# Patient Record
Sex: FEMALE | Race: WHITE | NOT HISPANIC OR LATINO | Employment: FULL TIME | URBAN - METROPOLITAN AREA
[De-identification: names, ages, dates, MRNs, and addresses within clinical notes are randomized per-mention and may not be internally consistent; named-entity substitution may affect disease eponyms.]

---

## 2017-03-07 ENCOUNTER — HOSPITAL ENCOUNTER (OUTPATIENT)
Dept: EMERGENCY MEDICINE | Facility: HOSPITAL | Age: 22
End: 2017-03-08

## 2017-08-03 ENCOUNTER — HISTORICAL (OUTPATIENT)
Dept: RADIOLOGY | Facility: HOSPITAL | Age: 22
End: 2017-08-03

## 2017-11-14 ENCOUNTER — HISTORICAL (OUTPATIENT)
Dept: RADIOLOGY | Facility: HOSPITAL | Age: 22
End: 2017-11-14

## 2021-01-02 LAB
INFLUENZA A ANTIGEN, POC: POSITIVE
INFLUENZA B ANTIGEN, POC: NEGATIVE

## 2021-04-08 ENCOUNTER — HISTORICAL (OUTPATIENT)
Dept: URGENT CARE | Facility: CLINIC | Age: 26
End: 2021-04-08

## 2021-04-08 LAB
ABS NEUT (OLG): 4.75 X10(3)/MCL (ref 2.1–9.2)
ALBUMIN SERPL-MCNC: 3.8 GM/DL (ref 3.5–5)
ALBUMIN/GLOB SERPL: 1.2 RATIO (ref 1.1–2)
ALP SERPL-CCNC: 74 UNIT/L (ref 40–150)
ALT SERPL-CCNC: 46 UNIT/L (ref 0–55)
AST SERPL-CCNC: 26 UNIT/L (ref 5–34)
BASOPHILS # BLD AUTO: 0.1 X10(3)/MCL (ref 0–0.2)
BASOPHILS NFR BLD AUTO: 1 %
BILIRUB SERPL-MCNC: 0.4 MG/DL
BILIRUBIN DIRECT+TOT PNL SERPL-MCNC: 0.2 MG/DL (ref 0–0.5)
BILIRUBIN DIRECT+TOT PNL SERPL-MCNC: 0.2 MG/DL (ref 0–0.8)
BUN SERPL-MCNC: 7.2 MG/DL (ref 7–18.7)
CALCIUM SERPL-MCNC: 9.2 MG/DL (ref 8.4–10.2)
CHLORIDE SERPL-SCNC: 106 MMOL/L (ref 98–107)
CO2 SERPL-SCNC: 24 MMOL/L (ref 22–29)
CREAT SERPL-MCNC: 0.61 MG/DL (ref 0.55–1.02)
EOSINOPHIL # BLD AUTO: 0.1 X10(3)/MCL (ref 0–0.9)
EOSINOPHIL NFR BLD AUTO: 1 %
ERYTHROCYTE [DISTWIDTH] IN BLOOD BY AUTOMATED COUNT: 13.4 % (ref 11.5–17)
GLOBULIN SER-MCNC: 3.1 GM/DL (ref 2.4–3.5)
GLUCOSE SERPL-MCNC: 83 MG/DL (ref 74–100)
HCT VFR BLD AUTO: 44.9 % (ref 37–47)
HGB BLD-MCNC: 14.4 GM/DL (ref 12–16)
LYMPHOCYTES # BLD AUTO: 4.5 X10(3)/MCL (ref 0.6–4.6)
LYMPHOCYTES NFR BLD AUTO: 45 %
MCH RBC QN AUTO: 30.4 PG (ref 27–31)
MCHC RBC AUTO-ENTMCNC: 32.1 GM/DL (ref 33–36)
MCV RBC AUTO: 94.7 FL (ref 80–94)
MONOCYTES # BLD AUTO: 0.5 X10(3)/MCL (ref 0.1–1.3)
MONOCYTES NFR BLD AUTO: 5 %
NEUTROPHILS # BLD AUTO: 4.75 X10(3)/MCL (ref 2.1–9.2)
NEUTROPHILS NFR BLD AUTO: 48 %
PLATELET # BLD AUTO: 549 X10(3)/MCL (ref 130–400)
PMV BLD AUTO: 9.9 FL (ref 9.4–12.4)
POTASSIUM SERPL-SCNC: 4.6 MMOL/L (ref 3.5–5.1)
PROT SERPL-MCNC: 6.9 GM/DL (ref 6.4–8.3)
RBC # BLD AUTO: 4.74 X10(6)/MCL (ref 4.2–5.4)
SODIUM SERPL-SCNC: 140 MMOL/L (ref 136–145)
WBC # SPEC AUTO: 10 X10(3)/MCL (ref 4.5–11.5)

## 2021-08-17 ENCOUNTER — HISTORICAL (OUTPATIENT)
Dept: ADMINISTRATIVE | Facility: HOSPITAL | Age: 26
End: 2021-08-17

## 2021-08-17 LAB
ABS NEUT (OLG): 6 X10(3)/MCL (ref 2.1–9.2)
ALBUMIN SERPL-MCNC: 4 GM/DL (ref 3.4–5)
ALBUMIN/GLOB SERPL: 1.6 {RATIO} (ref 1.5–2.5)
ALP SERPL-CCNC: 65 UNIT/L (ref 38–126)
ALT SERPL-CCNC: 25 UNIT/L (ref 7–52)
AMYLASE SERPL-CCNC: 47 UNIT/L (ref 25–125)
AST SERPL-CCNC: 21 UNIT/L (ref 15–37)
BILIRUB SERPL-MCNC: 0.3 MG/DL (ref 0.2–1)
BILIRUBIN DIRECT+TOT PNL SERPL-MCNC: 0.1 MG/DL (ref 0–0.5)
BILIRUBIN DIRECT+TOT PNL SERPL-MCNC: 0.2 MG/DL
BUN SERPL-MCNC: 8 MG/DL (ref 7–18)
CALCIUM SERPL-MCNC: 9.6 MG/DL (ref 8.5–10)
CHLORIDE SERPL-SCNC: 104 MMOL/L (ref 98–107)
CO2 SERPL-SCNC: 26 MMOL/L (ref 21–32)
CREAT SERPL-MCNC: 0.64 MG/DL (ref 0.6–1.3)
ERYTHROCYTE [DISTWIDTH] IN BLOOD BY AUTOMATED COUNT: 12.7 % (ref 11.5–17)
GLOBULIN SER-MCNC: 2.5 GM/DL (ref 1.2–3)
GLUCOSE SERPL-MCNC: 139 MG/DL (ref 74–106)
H PYLORI AB SER IA-ACNC: NEGATIVE
HCT VFR BLD AUTO: 40.4 % (ref 37–47)
HGB BLD-MCNC: 13.2 GM/DL (ref 12–16)
LIPASE SERPL-CCNC: 24 U/L
LYMPHOCYTES # BLD AUTO: 4.7 X10(3)/MCL (ref 0.6–3.4)
LYMPHOCYTES NFR BLD AUTO: 40.2 % (ref 13–40)
MCH RBC QN AUTO: 29.9 PG (ref 27–31.2)
MCHC RBC AUTO-ENTMCNC: 33 GM/DL (ref 32–36)
MCV RBC AUTO: 91 FL (ref 80–94)
MONOCYTES # BLD AUTO: 1 X10(3)/MCL (ref 0.1–1.3)
MONOCYTES NFR BLD AUTO: 8.6 % (ref 0.1–24)
NEUTROPHILS NFR BLD AUTO: 51.2 % (ref 47–80)
PLATELET # BLD AUTO: 476 X10(3)/MCL (ref 130–400)
PMV BLD AUTO: 8.5 FL (ref 9.4–12.4)
POTASSIUM SERPL-SCNC: 5 MMOL/L (ref 3.5–5.1)
PROT SERPL-MCNC: 6.5 GM/DL (ref 6.4–8.2)
RBC # BLD AUTO: 4.42 X10(6)/MCL (ref 4.2–5.4)
SODIUM SERPL-SCNC: 138 MMOL/L (ref 136–145)
WBC # SPEC AUTO: 11.7 X10(3)/MCL (ref 4.5–11.5)

## 2021-09-02 ENCOUNTER — HISTORICAL (OUTPATIENT)
Dept: INFECTIOUS DISEASES | Facility: HOSPITAL | Age: 26
End: 2021-09-02

## 2021-09-02 ENCOUNTER — HISTORICAL (OUTPATIENT)
Dept: ADMINISTRATIVE | Facility: HOSPITAL | Age: 26
End: 2021-09-02

## 2021-09-02 LAB — SARS-COV-2 RNA RESP QL NAA+PROBE: POSITIVE

## 2021-10-15 LAB
INFLUENZA A ANTIGEN, POC: NEGATIVE
INFLUENZA B ANTIGEN, POC: NEGATIVE

## 2021-12-01 LAB
INFLUENZA A ANTIGEN, POC: NEGATIVE
INFLUENZA B ANTIGEN, POC: NEGATIVE
RAPID GROUP A STREP (OHS): NEGATIVE

## 2021-12-07 ENCOUNTER — HISTORICAL (OUTPATIENT)
Dept: ADMINISTRATIVE | Facility: HOSPITAL | Age: 26
End: 2021-12-07

## 2022-04-10 ENCOUNTER — HISTORICAL (OUTPATIENT)
Dept: ADMINISTRATIVE | Facility: HOSPITAL | Age: 27
End: 2022-04-10

## 2022-04-25 VITALS
WEIGHT: 293 LBS | BODY MASS INDEX: 45.99 KG/M2 | SYSTOLIC BLOOD PRESSURE: 138 MMHG | OXYGEN SATURATION: 97 % | DIASTOLIC BLOOD PRESSURE: 80 MMHG | HEIGHT: 67 IN

## 2022-05-03 NOTE — HISTORICAL [facility]
This is a historical note converted from [employer]. Formatting and pictures may have been removed.  Please reference [employer] for original formatting and attached multimedia. Chief Complaint  NP-EST CARE-STOMACH ISSUES  History of Present Illness  She has been having stomach issues since she was a child. She was diagnosed with reflux as a child.  Her stomach has been bothering her more for the last few months. She reports more frequent bowel movements. She normally has 2-3 bowel movements a day. Today, she has had 5 bowel movements.  + nausea, usually after eating. No vomiting. She denies abdominal pain after eating. She has occasional heartburn. She denies belching. She denies pain or difficulty with swallowing. Worse with tomato based, red dye or greasy food. She denies indigestion. Her symptoms occur more in evening and when she gets up. She denies any urinary issues. No fever/chills. She denies any significant weight changes. She denies jaundice or scleral icterus.  Review of Systems  See HPI.  Physical Exam  Vitals & Measurements  T:?37.2? ?C (Oral)? HR:?85(Peripheral)? BP:?130/76? SpO2:?97%?  HT:?170.17?cm? HT:?170.17?cm? WT:?143.800?kg? WT:?143.8?kg? BMI:?49.66? LMP:?08/02/2021 00:00 CDT?  General:?She is a well-developed well-nourished obese white female no apparent distress.? She is alert and oriented.? She has a?depressed affect.  Chest:?Clear to auscultation bilaterally.  CV: Regular rate and rhythm without murmurs rubs gallops.  Abdomen: Soft,?NABS, no masses, obese,?mild epigastric?and right upper quadrant tenderness with palpation.? No guarding or rebound.  Assessment/Plan  1.?Gastritis?K29.70  Avoid heavy,?greasy or spicy foods.  Start omeprazole 40 mg; take 1 capsule daily 30 minutes to 1 hour prior to meal.  Labs pending, including H. pylori, pending.  Zofran 8 mg ODT?1 tablet every 8 hours as needed for nausea vomiting.  Call if symptoms persist, worsen or new symptoms develop.  Ordered:  Amylase Level,  Routine collect, 08/17/21 14:59:00 CDT, Blood, Order for future visit, Stop date 08/17/21 14:59:00 CDT, Lab Collect, Gastritis, 08/17/21 14:59:00 CDT  Automated Diff, Routine collect, 08/17/21 15:03:00 CDT, Blood, Collected, Stop date 08/17/21 15:03:00 CDT, Lab Collect, Gastritis, 08/17/21 15:03:00 CDT  CBC w/ Auto Diff, Routine collect, 08/17/21 15:03:00 CDT, Blood, Stop date 08/17/21 15:03:00 CDT, Lab Collect, Gastritis, 08/17/21 15:03:00 CDT  Clinic Follow up, *Est. 09/17/21 3:00:00 CDT, Order for future visit, Gastritis  Gastroesophageal reflux disease, [facility]  Comprehensive Metabolic Panel, Routine collect, 08/17/21 15:03:00 CDT, Blood, Stop date 08/17/21 15:03:00 CDT, Lab Collect, Gastritis, 08/17/21 15:03:00 CDT  Helicobacter Pylori Antibody Qual, Routine collect, 08/17/21 15:03:00 CDT, Blood, Stop date 08/17/21 15:03:00 CDT, Lab Collect, Gastritis  Gastroesophageal reflux disease, 08/17/21 15:03:00 CDT  Lipase Level, Routine collect, 08/17/21 14:59:00 CDT, Blood, Order for future visit, Stop date 08/17/21 14:59:00 CDT, Lab Collect, Gastritis, 08/17/21 14:59:00 CDT  Office/Outpatient Visit Level 4 New [id number] PC, Gastritis  Gastroesophageal reflux disease  Morbid obesity, [facility] - AFP, 08/17/21 15:00:00 CDT  ?  2.?Gastroesophageal reflux disease?K21.9  ?See above.  Avoid lying down for 2-3 hours after eating.  Avoid triggering foods.  Ordered:  Clinic Follow up, *Est. 09/17/21 3:00:00 CDT, Order for future visit, Gastritis  Gastroesophageal reflux disease, [facility]  Helicobacter Pylori Antibody Qual, Routine collect, 08/17/21 15:03:00 CDT, Blood, Stop date 08/17/21 15:03:00 CDT, Lab Collect, Gastritis  Gastroesophageal reflux disease, 08/17/21 15:03:00 CDT  Office/Outpatient Visit Level 4 New [id number] PC, Gastritis  Gastroesophageal reflux disease  Morbid obesity, [facility] - AFP, 08/17/21 15:00:00 CDT  ?  3.?Morbid obesity?E66.01  Patient encouraged to lose weight.  Ordered:  Office/Outpatient  Visit Level 4 New [id number] PC, Gastritis  Gastroesophageal reflux disease  Morbid obesity, [facility] - AFP, 08/17/21 15:00:00 CDT  ?  Orders:  omeprazole, 40 mg = 1 cap(s), Oral, Daily, # 30 cap(s), 1 Refill(s), Pharmacy: [facility] #[id number], 170.17, cm, Height/Length Dosing, 08/17/21 14:26:00 CDT, 143.8, kg, Weight Dosing, 08/17/21 14:26:00 CDT  ondansetron, 8 mg = 1 tab(s), Oral, TID, # 20 tab(s), 0 Refill(s), Pharmacy: [facility] #[id number], 170.17, cm, Height/Length Dosing, 08/17/21 14:26:00 CDT, 143.8, kg, Weight Dosing, 08/17/21 14:26:00 CDT  Referrals  Clinic Follow up, *Est. 09/17/21 3:00:00 CDT, Order for future visit, Gastritis  Gastroesophageal reflux disease, [facility] AFP   Problem List/Past Medical History  Ongoing  Gastroesophageal reflux disease  Morbid obesity  Historical  Asthma  Asthma, mild intermittent  Fibula fracture  Procedure/Surgical History  Open reduction of fracture of tibia and fibula with internal fixation (10/06/2014)  Open treatment of distal fibular fracture (lateral malleolus), includes internal fixation, when performed. (10/06/2014)  ORIF Ankle (Right) (10/06/2014)  Tonsillectomy (2003)   Medications  cetirizine 10 mg oral tablet, 10 mg= 1 tab(s), Oral, Daily  ethinyl estradiol-norethindrone with iron 20 mcg-1 mg oral tablet, 1 tab(s), Oral, Daily  omeprazole 40 mg oral DR capsule, 40 mg= 1 cap(s), Oral, Daily, 1 refills  Zofran ODT 8 mg oral tablet, disintegrating, 8 mg= 1 tab(s), Oral, TID  Allergies  No Known Allergies  Social History  Abuse/Neglect  No, 08/17/2021  No, 04/08/2021  Alcohol - Denies Alcohol Use, 10/02/2014  Current, Beer, Wine, Liquor, 1-2 times per week, 08/17/2021  Current, Wine, 1-2 times per year, 08/03/2016  Employment/School  Employed, Work/School description: /[employer]., 08/17/2021  Student, Work/School description: [employer]., 08/03/2016  Exercise  Home/Environment  Lives with Significant other. Living situation: Home/Independent.,  08/17/2021  Lives with Father, Mother. Living situation: Home/Independent., 08/03/2016  Nutrition/Health  Regular, Fair, 08/17/2021  Regular, 08/03/2016  Sexual  Sexually active: Yes., 08/03/2016  Substance Use - Denies Substance Abuse, 10/02/2014  Never, 08/17/2021  Never, 08/03/2016  Tobacco - Denies Tobacco Use, 10/02/2014  Never (less than 100 in lifetime), N/A, 08/17/2021  Never (less than 100 in lifetime), N/A, 04/08/2021  Never smoker, 08/03/2016  Family History  Alzheimer disease: Negative: Father.  CAD - Coronary artery disease: Mother.  Gout.......: Father.  Hyperlipidemia.: Mother.  Hypertension.: Mother, Father and Sister.  Seizure: Sister.  Immunizations  Vaccine Date Status   COVID-19, vector nr, [id number] - Candi 04/11/2021 Given   influenza virus vaccine, inactivated 10/04/2018 Recorded   influenza virus vaccine, inactivated 12/08/2017 Recorded   influenza virus vaccine, inactivated 03/08/2017 Given   varicella virus vaccine 06/10/2015 Recorded   tetanus/diphtheria/pertussis, acel(Tdap) 06/10/2015 Recorded   meningococcal conjugate vaccine 06/10/2015 Recorded   poliovirus vaccine, inactivated 12/18/2001 Recorded   measles/mumps/rubella virus vaccine 12/18/2001 Recorded   varicella virus vaccine 01/12/1998 Recorded   poliovirus vaccine, live, trivalent 01/12/1998 Recorded   measles/mumps/rubella virus vaccine 01/12/1998 Recorded   poliovirus vaccine, live, trivalent 06/20/1997 Recorded   hepatitis B pediatric vaccine 06/20/1997 Recorded   poliovirus vaccine, live, trivalent 04/21/1997 Recorded   poliovirus vaccine, live, trivalent 01/29/1997 Recorded   hepatitis B pediatric vaccine 01/29/1997 Recorded   hepatitis B pediatric vaccine 1996 Recorded   Health Maintenance  Health Maintenance  ???Pending?(in the next year)  ??? ??OverDue  ??? ? ? ?Depression Screening due??09/12/19??and every 1??year(s)  ??? ? ? ?Diabetes Screening due??03/06/20??and every 3??year(s)  ??? ? ? ?Alcohol Misuse Screening  due??01/02/21??and every 1??year(s)  ??? ??Due?  ??? ? ? ?ADL Screening due??08/17/21??and every 1??year(s)  ??? ??Due In Future?  ??? ? ? ?Obesity Screening not due until??01/01/22??and every 1??year(s)  ???Satisfied?(in the past 1 year)  ??? ??Satisfied?  ??? ? ? ?Blood Pressure Screening on??08/17/21.??Satisfied by Selvin Skelton LPN  ??? ? ? ?Body Mass Index Check on??08/17/21.??Satisfied by Selvin Skelton LPN  ??? ? ? ?Cervical Cancer Screening on??11/10/20.??Satisfied by Erika Puentes  ??? ? ? ?Diabetes Screening on??04/08/21.??Satisfied by Joleen Hector  ??? ? ? ?Influenza Vaccine on??01/02/21.??Satisfied by Daisy Barnes  ??? ? ? ?Obesity Screening on??08/17/21.??Satisfied by Selvin Skelton LPN  ?

## 2022-06-17 ENCOUNTER — OFFICE VISIT (OUTPATIENT)
Dept: URGENT CARE | Facility: CLINIC | Age: 27
End: 2022-06-17
Payer: COMMERCIAL

## 2022-06-17 VITALS
WEIGHT: 293 LBS | TEMPERATURE: 99 F | BODY MASS INDEX: 44.41 KG/M2 | OXYGEN SATURATION: 96 % | SYSTOLIC BLOOD PRESSURE: 150 MMHG | HEART RATE: 72 BPM | HEIGHT: 68 IN | DIASTOLIC BLOOD PRESSURE: 85 MMHG | RESPIRATION RATE: 20 BRPM

## 2022-06-17 DIAGNOSIS — R11.15 CYCLIC VOMITING SYNDROME: Primary | ICD-10-CM

## 2022-06-17 PROCEDURE — 3008F BODY MASS INDEX DOCD: CPT | Mod: CPTII,,, | Performed by: FAMILY MEDICINE

## 2022-06-17 PROCEDURE — 3077F PR MOST RECENT SYSTOLIC BLOOD PRESSURE >= 140 MM HG: ICD-10-PCS | Mod: CPTII,,, | Performed by: FAMILY MEDICINE

## 2022-06-17 PROCEDURE — 1159F MED LIST DOCD IN RCRD: CPT | Mod: CPTII,,, | Performed by: FAMILY MEDICINE

## 2022-06-17 PROCEDURE — 3077F SYST BP >= 140 MM HG: CPT | Mod: CPTII,,, | Performed by: FAMILY MEDICINE

## 2022-06-17 PROCEDURE — 3079F PR MOST RECENT DIASTOLIC BLOOD PRESSURE 80-89 MM HG: ICD-10-PCS | Mod: CPTII,,, | Performed by: FAMILY MEDICINE

## 2022-06-17 PROCEDURE — 1160F RVW MEDS BY RX/DR IN RCRD: CPT | Mod: CPTII,,, | Performed by: FAMILY MEDICINE

## 2022-06-17 PROCEDURE — 3008F PR BODY MASS INDEX (BMI) DOCUMENTED: ICD-10-PCS | Mod: CPTII,,, | Performed by: FAMILY MEDICINE

## 2022-06-17 PROCEDURE — 99214 OFFICE O/P EST MOD 30 MIN: CPT | Mod: ,,, | Performed by: FAMILY MEDICINE

## 2022-06-17 PROCEDURE — 1160F PR REVIEW ALL MEDS BY PRESCRIBER/CLIN PHARMACIST DOCUMENTED: ICD-10-PCS | Mod: CPTII,,, | Performed by: FAMILY MEDICINE

## 2022-06-17 PROCEDURE — 99214 PR OFFICE/OUTPT VISIT, EST, LEVL IV, 30-39 MIN: ICD-10-PCS | Mod: ,,, | Performed by: FAMILY MEDICINE

## 2022-06-17 PROCEDURE — 3079F DIAST BP 80-89 MM HG: CPT | Mod: CPTII,,, | Performed by: FAMILY MEDICINE

## 2022-06-17 PROCEDURE — 1159F PR MEDICATION LIST DOCUMENTED IN MEDICAL RECORD: ICD-10-PCS | Mod: CPTII,,, | Performed by: FAMILY MEDICINE

## 2022-06-17 RX ORDER — ONDANSETRON 8 MG/1
8 TABLET, ORALLY DISINTEGRATING ORAL 2 TIMES DAILY
Qty: 12 TABLET | Refills: 0 | Status: SHIPPED | OUTPATIENT
Start: 2022-06-17 | End: 2023-07-05 | Stop reason: SDUPTHER

## 2022-06-17 RX ORDER — OMEPRAZOLE 40 MG/1
40 CAPSULE, DELAYED RELEASE ORAL DAILY
COMMUNITY
Start: 2022-06-07 | End: 2022-08-04

## 2022-06-17 RX ORDER — NORETHINDRONE ACETATE AND ETHINYL ESTRADIOL 1MG-20(21)
1 KIT ORAL DAILY
COMMUNITY
Start: 2022-04-24 | End: 2023-07-05

## 2022-06-17 RX ORDER — FLUTICASONE FUROATE AND VILANTEROL TRIFENATATE 200; 25 UG/1; UG/1
1 POWDER RESPIRATORY (INHALATION) DAILY
COMMUNITY
Start: 2022-01-03 | End: 2022-08-08 | Stop reason: SDUPTHER

## 2022-06-17 NOTE — PATIENT INSTRUCTIONS
Force fluids.  Zofran for nausea.  Russell diet.  Continue the antacid. Avoid caffeine, spicy foods, tomato based foods.    Discuss with primary MD possible need for GI referral.

## 2022-06-17 NOTE — PROGRESS NOTES
"Subjective:       Patient ID: Saranya Pacheco is a 26 y.o. female.    Vitals:  height is 5' 8" (1.727 m) and weight is 146.6 kg (323 lb 3.2 oz) (abnormal). Her oral temperature is 98.6 °F (37 °C). Her blood pressure is 150/85 (abnormal) and her pulse is 72. Her respiration is 20 and oxygen saturation is 96%.     Chief Complaint: Emesis (Nausea, vomiting, diarrhea, and stomach cramps x 3 weeks (reoccurring issue))    Nausea, vomiting, diarrhea, and stomach cramps x 3 weeks (reoccurring issue). Has done blood work. Pt states taht they may need to go to a GI doctor.  Recently started on Prilosec with improvement of diarrhea but not of vomiting.  Vomiting in the am, no melena or hematochezia.  Diarrhea wakes her up at bedtime.  No fullness sensation with minimal eating.  Not eating much and feeling light headed at work. Had workup with gallbladder and thyroid in the past per patient report.     Emesis   This is a recurrent problem. The current episode started 1 to 4 weeks ago. The problem occurs less than 2 times per day. The problem has been gradually worsening. The emesis has an appearance of stomach contents. There has been no fever (pt suspects fever last night). Associated symptoms include abdominal pain and diarrhea. Treatments tried: prilosec. The treatment provided no relief.       Gastrointestinal: Positive for abdominal pain, vomiting and diarrhea.       Objective:      Physical Exam   Constitutional: She is oriented to person, place, and time. She appears well-developed.   HENT:   Head: Normocephalic and atraumatic.   Ears:   Right Ear: External ear normal.   Left Ear: External ear normal.   Nose: Nose normal.   Mouth/Throat: Mucous membranes are normal.   Eyes: Conjunctivae and lids are normal.   Neck: Trachea normal. Neck supple.   Cardiovascular: Normal rate, regular rhythm and normal heart sounds.   Pulmonary/Chest: Effort normal and breath sounds normal. No respiratory distress.   Abdominal: Normal " appearance and bowel sounds are normal. She exhibits no distension, no abdominal bruit, no pulsatile midline mass and no mass. Soft. There is abdominal tenderness.      Comments: generalized tenderness   Musculoskeletal: Normal range of motion.         General: Normal range of motion.   Neurological: She is alert and oriented to person, place, and time. She has normal strength.   Skin: Skin is warm, dry, intact, not diaphoretic and not pale.   Psychiatric: Her speech is normal and behavior is normal. Judgment and thought content normal.   Nursing note and vitals reviewed.        Assessment:       1. Cyclic vomiting syndrome          Plan:         Cyclic vomiting syndrome  -     ondansetron (ZOFRAN-ODT) 8 MG TbDL; Take 1 tablet (8 mg total) by mouth 2 (two) times daily.  Dispense: 12 tablet; Refill: 0

## 2022-07-11 PROCEDURE — 83690 ASSAY OF LIPASE: CPT | Performed by: NURSE PRACTITIONER

## 2022-08-04 ENCOUNTER — OFFICE VISIT (OUTPATIENT)
Dept: URGENT CARE | Facility: CLINIC | Age: 27
End: 2022-08-04
Payer: COMMERCIAL

## 2022-08-04 VITALS
TEMPERATURE: 99 F | WEIGHT: 293 LBS | HEIGHT: 67 IN | BODY MASS INDEX: 45.99 KG/M2 | OXYGEN SATURATION: 96 % | SYSTOLIC BLOOD PRESSURE: 134 MMHG | HEART RATE: 92 BPM | DIASTOLIC BLOOD PRESSURE: 90 MMHG

## 2022-08-04 DIAGNOSIS — J00 NASOPHARYNGITIS: Primary | ICD-10-CM

## 2022-08-04 DIAGNOSIS — J02.9 SORE THROAT: ICD-10-CM

## 2022-08-04 LAB
CTP QC/QA: YES
CTP QC/QA: YES
S PYO RRNA THROAT QL PROBE: NEGATIVE
SARS-COV-2 RDRP RESP QL NAA+PROBE: NEGATIVE

## 2022-08-04 PROCEDURE — 3080F DIAST BP >= 90 MM HG: CPT | Mod: CPTII,,, | Performed by: FAMILY MEDICINE

## 2022-08-04 PROCEDURE — 3080F PR MOST RECENT DIASTOLIC BLOOD PRESSURE >= 90 MM HG: ICD-10-PCS | Mod: CPTII,,, | Performed by: FAMILY MEDICINE

## 2022-08-04 PROCEDURE — 3008F BODY MASS INDEX DOCD: CPT | Mod: CPTII,,, | Performed by: FAMILY MEDICINE

## 2022-08-04 PROCEDURE — 3075F PR MOST RECENT SYSTOLIC BLOOD PRESS GE 130-139MM HG: ICD-10-PCS | Mod: CPTII,,, | Performed by: FAMILY MEDICINE

## 2022-08-04 PROCEDURE — 1160F PR REVIEW ALL MEDS BY PRESCRIBER/CLIN PHARMACIST DOCUMENTED: ICD-10-PCS | Mod: CPTII,,, | Performed by: FAMILY MEDICINE

## 2022-08-04 PROCEDURE — 87880 STREP A ASSAY W/OPTIC: CPT | Mod: QW,,, | Performed by: FAMILY MEDICINE

## 2022-08-04 PROCEDURE — 3008F PR BODY MASS INDEX (BMI) DOCUMENTED: ICD-10-PCS | Mod: CPTII,,, | Performed by: FAMILY MEDICINE

## 2022-08-04 PROCEDURE — 3075F SYST BP GE 130 - 139MM HG: CPT | Mod: CPTII,,, | Performed by: FAMILY MEDICINE

## 2022-08-04 PROCEDURE — 1159F PR MEDICATION LIST DOCUMENTED IN MEDICAL RECORD: ICD-10-PCS | Mod: CPTII,,, | Performed by: FAMILY MEDICINE

## 2022-08-04 PROCEDURE — U0002: ICD-10-PCS | Mod: QW,,, | Performed by: FAMILY MEDICINE

## 2022-08-04 PROCEDURE — 1159F MED LIST DOCD IN RCRD: CPT | Mod: CPTII,,, | Performed by: FAMILY MEDICINE

## 2022-08-04 PROCEDURE — 87880 POCT RAPID STREP A: ICD-10-PCS | Mod: QW,,, | Performed by: FAMILY MEDICINE

## 2022-08-04 PROCEDURE — 1160F RVW MEDS BY RX/DR IN RCRD: CPT | Mod: CPTII,,, | Performed by: FAMILY MEDICINE

## 2022-08-04 PROCEDURE — U0002 COVID-19 LAB TEST NON-CDC: HCPCS | Mod: QW,,, | Performed by: FAMILY MEDICINE

## 2022-08-04 PROCEDURE — 99213 PR OFFICE/OUTPT VISIT, EST, LEVL III, 20-29 MIN: ICD-10-PCS | Mod: 25,,, | Performed by: FAMILY MEDICINE

## 2022-08-04 PROCEDURE — 99213 OFFICE O/P EST LOW 20 MIN: CPT | Mod: 25,,, | Performed by: FAMILY MEDICINE

## 2022-08-04 RX ORDER — PREDNISONE 20 MG/1
20 TABLET ORAL 2 TIMES DAILY
Qty: 6 TABLET | Refills: 0 | Status: SHIPPED | OUTPATIENT
Start: 2022-08-04 | End: 2022-08-07

## 2022-08-04 RX ORDER — CETIRIZINE HYDROCHLORIDE 10 MG/1
10 TABLET ORAL DAILY
COMMUNITY

## 2022-08-04 NOTE — PATIENT INSTRUCTIONS
Flonase and saline nasal spray twice a day, antihistamine at bedtime.  Force fluids.  Prednisone with food. Cough may linger a few weeks but should not have fever, chest pain, or shortness of breath.   If sinus pressure remains more than 6 days can call for antibiotic like Augmentin.

## 2022-08-04 NOTE — PROGRESS NOTES
"Subjective:       Patient ID: Saranya Pacheco is a 26 y.o. female.    Vitals:  height is 5' 7" (1.702 m) and weight is 136.1 kg (300 lb). Her temperature is 98.5 °F (36.9 °C). Her blood pressure is 134/90 (abnormal) and her pulse is 92. Her oxygen saturation is 96%.     Chief Complaint: Sore Throat (Sore throat, sinus pressure, Earache since yesterday)    Approx 1 day of HA, pharyngitis, and cough.  Hx asthma, using breo as prescribed.  No fever. No known exposures.     Sore Throat   This is a new problem. The current episode started yesterday. The problem has been gradually worsening. There has been no fever. The fever has been present for less than 1 day. The pain is at a severity of 6/10. The pain is moderate. Associated symptoms include coughing, ear pain and headaches. Pertinent negatives include no congestion, neck pain, shortness of breath or trouble swallowing. She has tried acetaminophen and NSAIDs for the symptoms. The treatment provided mild relief.       Constitution: Negative for chills, fatigue and fever.   HENT: Positive for ear pain, postnasal drip and sore throat. Negative for congestion, sinus pressure and trouble swallowing.    Neck: Negative for neck pain and neck stiffness.   Cardiovascular: Negative for chest pain, leg swelling and sob on exertion.   Respiratory: Positive for cough. Negative for chest tightness, shortness of breath and wheezing.    Neurological: Positive for headaches. Negative for dizziness, disorientation and altered mental status.   Psychiatric/Behavioral: Negative for altered mental status and disorientation.       Objective:      Physical Exam   Constitutional: She is oriented to person, place, and time. She appears well-developed. No distress.   HENT:   Head: Normocephalic.   Ears:   Right Ear: Tympanic membrane and external ear normal.   Left Ear: Tympanic membrane and external ear normal.   Nose: Rhinorrhea present.   Mouth/Throat: Uvula is midline and mucous " membranes are normal. No uvula swelling. Cobblestoning present. No oropharyngeal exudate or posterior oropharyngeal edema. Tonsils are 0 on the right. Tonsils are 0 on the left. No tonsillar exudate.   Eyes: Pupils are equal, round, and reactive to light. Right eye exhibits no discharge. Left eye exhibits no discharge.   Neck: Neck supple. No tracheal deviation present.   Cardiovascular: Normal rate, regular rhythm and normal heart sounds.   No murmur heard.  Pulmonary/Chest: Effort normal and breath sounds normal. No stridor. No respiratory distress. She has no wheezes.   Abdominal: Normal appearance.   Lymphadenopathy:     She has no cervical adenopathy.   Neurological: She is alert and oriented to person, place, and time.   Skin: Skin is warm and dry.   Psychiatric: Mood and thought content normal.   Nursing note and vitals reviewed.        Assessment:       1. Nasopharyngitis    2. Sore throat          Plan:         Nasopharyngitis  -     predniSONE (DELTASONE) 20 MG tablet; Take 1 tablet (20 mg total) by mouth 2 (two) times daily. for 3 days  Dispense: 6 tablet; Refill: 0    Sore throat  -     POCT COVID-19 Rapid Screening  -     POCT rapid strep A            COVID test negative.  Strep test negative.

## 2022-08-06 ENCOUNTER — TELEPHONE (OUTPATIENT)
Dept: URGENT CARE | Facility: CLINIC | Age: 27
End: 2022-08-06

## 2022-08-06 RX ORDER — AMOXICILLIN AND CLAVULANATE POTASSIUM 875; 125 MG/1; MG/1
1 TABLET, FILM COATED ORAL EVERY 12 HOURS
Qty: 20 TABLET | Refills: 0 | Status: SHIPPED | OUTPATIENT
Start: 2022-08-06 | End: 2022-08-16

## 2022-08-06 NOTE — TELEPHONE ENCOUNTER
Pt was seen 08/04 by Dr. William. States she was told to call back if symptoms are worsening with sinus pressure and she would be prescribed Augmentin. Pt wants to know if it can be sent to her pharmacy. Please advise.     Pt called back and asked if she could also have a refill of her Breo inhaler, or if another could be prescribed if not.

## 2022-09-20 ENCOUNTER — HISTORICAL (OUTPATIENT)
Dept: ADMINISTRATIVE | Facility: HOSPITAL | Age: 27
End: 2022-09-20

## 2022-09-21 ENCOUNTER — HISTORICAL (OUTPATIENT)
Dept: ADMINISTRATIVE | Facility: HOSPITAL | Age: 27
End: 2022-09-21

## 2022-10-15 ENCOUNTER — OFFICE VISIT (OUTPATIENT)
Dept: URGENT CARE | Facility: CLINIC | Age: 27
End: 2022-10-15
Payer: COMMERCIAL

## 2022-10-15 VITALS
RESPIRATION RATE: 20 BRPM | HEIGHT: 68 IN | TEMPERATURE: 98 F | DIASTOLIC BLOOD PRESSURE: 83 MMHG | OXYGEN SATURATION: 97 % | SYSTOLIC BLOOD PRESSURE: 145 MMHG | HEART RATE: 82 BPM | WEIGHT: 290 LBS | BODY MASS INDEX: 43.95 KG/M2

## 2022-10-15 DIAGNOSIS — R07.89 COSTOCHONDRAL CHEST PAIN: Primary | ICD-10-CM

## 2022-10-15 DIAGNOSIS — R10.33 ACUTE PERIUMBILICAL PAIN: ICD-10-CM

## 2022-10-15 DIAGNOSIS — R11.0 NAUSEA: ICD-10-CM

## 2022-10-15 PROCEDURE — 1159F MED LIST DOCD IN RCRD: CPT | Mod: CPTII,,, | Performed by: PHYSICIAN ASSISTANT

## 2022-10-15 PROCEDURE — 99202 OFFICE O/P NEW SF 15 MIN: CPT | Mod: ,,, | Performed by: PHYSICIAN ASSISTANT

## 2022-10-15 PROCEDURE — 1159F PR MEDICATION LIST DOCUMENTED IN MEDICAL RECORD: ICD-10-PCS | Mod: CPTII,,, | Performed by: PHYSICIAN ASSISTANT

## 2022-10-15 PROCEDURE — 3077F SYST BP >= 140 MM HG: CPT | Mod: CPTII,,, | Performed by: PHYSICIAN ASSISTANT

## 2022-10-15 PROCEDURE — 1160F PR REVIEW ALL MEDS BY PRESCRIBER/CLIN PHARMACIST DOCUMENTED: ICD-10-PCS | Mod: CPTII,,, | Performed by: PHYSICIAN ASSISTANT

## 2022-10-15 PROCEDURE — S0119 ONDANSETRON 4 MG: HCPCS | Mod: ,,, | Performed by: PHYSICIAN ASSISTANT

## 2022-10-15 PROCEDURE — S0119 PR ONDANSETRON, ORAL, 4MG: ICD-10-PCS | Mod: ,,, | Performed by: PHYSICIAN ASSISTANT

## 2022-10-15 PROCEDURE — 3077F PR MOST RECENT SYSTOLIC BLOOD PRESSURE >= 140 MM HG: ICD-10-PCS | Mod: CPTII,,, | Performed by: PHYSICIAN ASSISTANT

## 2022-10-15 PROCEDURE — 3079F PR MOST RECENT DIASTOLIC BLOOD PRESSURE 80-89 MM HG: ICD-10-PCS | Mod: CPTII,,, | Performed by: PHYSICIAN ASSISTANT

## 2022-10-15 PROCEDURE — 1160F RVW MEDS BY RX/DR IN RCRD: CPT | Mod: CPTII,,, | Performed by: PHYSICIAN ASSISTANT

## 2022-10-15 PROCEDURE — 3079F DIAST BP 80-89 MM HG: CPT | Mod: CPTII,,, | Performed by: PHYSICIAN ASSISTANT

## 2022-10-15 PROCEDURE — 99202 PR OFFICE/OUTPT VISIT, NEW, LEVL II, 15-29 MIN: ICD-10-PCS | Mod: ,,, | Performed by: PHYSICIAN ASSISTANT

## 2022-10-15 RX ORDER — ONDANSETRON 8 MG/1
8 TABLET, ORALLY DISINTEGRATING ORAL
Status: COMPLETED | OUTPATIENT
Start: 2022-10-15 | End: 2022-10-15

## 2022-10-15 RX ADMIN — ONDANSETRON 8 MG: 8 TABLET, ORALLY DISINTEGRATING ORAL at 03:10

## 2022-10-15 NOTE — LETTER
October 15, 2022      [facility]  [address]  [place] [address]  Phone: [phone]  Fax: [phone]       Patient: Saranya Pacheco   YOB: 1995  Date of Visit: 10/15/2022    To Whom It May Concern:    Rudy Pacheco  was at [facility] on 10/15/2022.  If you have any questions or concerns, or if I can be of further assistance, please do not hesitate to contact me.    Sincerely,    Neva Self RT

## 2022-10-15 NOTE — PROGRESS NOTES
"Subjective:       Patient ID: Saranya Pacheco is a 26 y.o. female.    Vitals:  height is 5' 8" (1.727 m) and weight is 131.5 kg (290 lb). Her temperature is 98.3 °F (36.8 °C). Her blood pressure is 145/83 (abnormal) and her pulse is 82. Her respiration is 20 and oxygen saturation is 97%.     Chief Complaint: STOMACH ISSUES (Pt symptoms started Monday, dizziness, felt faint, nausea, and today pain in left chest area radiating across the chest (sob during this time). )    HPI  Obese female with chronic abd discomfort currently on PPI states not able to get GI referral having dizziness while in hot shower 5 days ago near syncope resolved doing well over the last 3 days until waking today and driving to work having left chest wall pain improving with personal massage now having nausea and periumbilical pain at work leaving presents to urgent clinic for evaluation.   STOMACH ISSUES     Additional comments: Pt symptoms started Monday, dizziness, felt faint,   nausea, and today pain in left chest area radiating across the chest and abd pain(sob   during this time).     Abdominal Pain  This is a new problem. The current episode started today. The abdominal pain radiates to the periumbilical region. Associated symptoms include myalgias and nausea. Pertinent negatives include no anorexia, arthralgias, constipation, diarrhea, dysuria, fever, headaches or vomiting.   Constitution: Negative for chills, fatigue and fever.   HENT:  Negative for ear pain, congestion, sinus pain, sinus pressure, sore throat, trouble swallowing and voice change.    Neck: Negative for neck pain and neck swelling.   Cardiovascular:  Positive for chest pain. Negative for chest trauma.   Respiratory:  Negative for cough, stridor and wheezing.    Gastrointestinal:  Positive for abdominal pain and nausea. Negative for vomiting, constipation and diarrhea.   Genitourinary:  Negative for dysuria.   Musculoskeletal:  Positive for muscle ache. Negative for " pain, joint pain and back pain.   Skin: Negative.    Allergic/Immunologic: Negative.    Neurological:  Positive for dizziness. Negative for headaches and altered mental status.   Psychiatric/Behavioral:  Negative for altered mental status.      Objective:      Physical Exam   Constitutional: She is oriented to person, place, and time. She appears well-developed. She is cooperative.  Non-toxic appearance. She does not appear ill. No distress.      Comments:Awake ambulatory female speaks in complete sentences   obesity  HENT:   Head: Normocephalic.   Ears:   Right Ear: Hearing and external ear normal.   Left Ear: Hearing and external ear normal.   Nose: Nose normal. No mucosal edema, rhinorrhea, nasal deformity or congestion. No epistaxis. Right sinus exhibits no maxillary sinus tenderness and no frontal sinus tenderness. Left sinus exhibits no maxillary sinus tenderness and no frontal sinus tenderness.   Mouth/Throat: Uvula is midline, oropharynx is clear and moist and mucous membranes are normal. No trismus in the jaw. Normal dentition. No uvula swelling. No oropharyngeal exudate, posterior oropharyngeal edema or posterior oropharyngeal erythema.   Eyes: Conjunctivae and lids are normal. No scleral icterus.   Neck: Trachea normal and phonation normal. Neck supple. No edema present. No erythema present. No neck rigidity present.   Cardiovascular: Normal rate, regular rhythm, normal heart sounds and normal pulses.   Pulmonary/Chest: Effort normal and breath sounds normal. No respiratory distress. She has no decreased breath sounds. She has no wheezes. She has no rhonchi. She has no rales. She exhibits tenderness. She exhibits no crepitus, no edema, no deformity, no swelling and no retraction.       Abdominal: Normal appearance. She exhibits no mass. Soft. There is no abdominal tenderness. There is no guarding.      Comments: No reproducible periumbilical pain on exam at this time   Musculoskeletal: Normal range of  "motion.         General: Normal range of motion.   Neurological: no focal deficit. She is alert and oriented to person, place, and time. She exhibits normal muscle tone. Coordination normal.   Skin: Skin is warm, dry, intact, not diaphoretic, not pale and no rash. Capillary refill takes less than 2 seconds.   Psychiatric: Her speech is normal and behavior is normal. Judgment and thought content normal. Her mood appears anxious.   Nursing note and vitals reviewed.         Previous History      Review of patient's allergies indicates:  No Known Allergies    Past Medical History:   Diagnosis Date    Asthma     Fatty liver      Current Outpatient Medications   Medication Instructions    BLISOVI FE 1/20, 28, 1 mg-20 mcg (21)/75 mg (7) per tablet 1 tablet, Oral, Daily    BREO ELLIPTA 200-25 mcg/dose DsDv diskus inhaler 1 puff, Inhalation, Daily    cetirizine (ZYRTEC) 10 mg, Oral, Daily    omeprazole (PRILOSEC) 40 MG capsule TAKE 1 CAPSULE BY MOUTH DAILY    ondansetron (ZOFRAN-ODT) 8 mg, Oral, 2 times daily     Past Surgical History:   Procedure Laterality Date    ANKLE SURGERY      TONSILLECTOMY      WISDOM TOOTH EXTRACTION       Family History   Problem Relation Age of Onset    Hypotension Mother     Heart attack Mother     Depression Father     Schizophrenia Father     Epilepsy Sister        Social History     Tobacco Use    Smoking status: Never    Smokeless tobacco: Never   Substance Use Topics    Alcohol use: Yes     Alcohol/week: 7.0 standard drinks     Types: 7 Drinks containing 0.5 oz of alcohol per week    Drug use: Never        Physical Exam      Vital Signs Reviewed   BP (!) 145/83   Pulse 82   Temp 98.3 °F (36.8 °C)   Resp 20   Ht 5' 8" (1.727 m)   Wt 131.5 kg (290 lb)   SpO2 97%   BMI 44.09 kg/m²        Procedures    Procedures     Labs     Results for orders placed or performed in visit on 09/21/22   POCT rapid strep A   Result Value Ref Range    Rapid Group A Strep Negative    POCT Influenza A/B " Molecular   Result Value Ref Range    Inflenza A Ag Negative     Influenza B Ag Negative        Assessment:       1. Costochondral chest pain    2. Acute periumbilical pain    3. Nausea          Plan:     Pt declines viral testing in clinic.  Pt understands concern for costochondritis also concern for periumbilical abd pain and recommendation for acute ER evaluation now.  Pt accepts Zofran ODT and will travel POV for further evaluation.    Go to ER now for abd pain further evaluation.    Costochondral chest pain    Acute periumbilical pain    Nausea    Other orders  -     ondansetron disintegrating tablet 8 mg

## 2022-11-04 ENCOUNTER — OFFICE VISIT (OUTPATIENT)
Dept: URGENT CARE | Facility: CLINIC | Age: 27
End: 2022-11-04
Payer: COMMERCIAL

## 2022-11-04 VITALS
SYSTOLIC BLOOD PRESSURE: 159 MMHG | HEART RATE: 83 BPM | BODY MASS INDEX: 43.95 KG/M2 | DIASTOLIC BLOOD PRESSURE: 94 MMHG | TEMPERATURE: 98 F | RESPIRATION RATE: 18 BRPM | WEIGHT: 290 LBS | OXYGEN SATURATION: 97 % | HEIGHT: 68 IN

## 2022-11-04 DIAGNOSIS — R11.0 NAUSEA: Primary | ICD-10-CM

## 2022-11-04 LAB
CTP QC/QA: YES
POC MOLECULAR INFLUENZA A AGN: NEGATIVE
POC MOLECULAR INFLUENZA B AGN: NEGATIVE

## 2022-11-04 PROCEDURE — 1160F PR REVIEW ALL MEDS BY PRESCRIBER/CLIN PHARMACIST DOCUMENTED: ICD-10-PCS | Mod: CPTII,,, | Performed by: NURSE PRACTITIONER

## 2022-11-04 PROCEDURE — 3077F PR MOST RECENT SYSTOLIC BLOOD PRESSURE >= 140 MM HG: ICD-10-PCS | Mod: CPTII,,, | Performed by: NURSE PRACTITIONER

## 2022-11-04 PROCEDURE — 3008F BODY MASS INDEX DOCD: CPT | Mod: CPTII,,, | Performed by: NURSE PRACTITIONER

## 2022-11-04 PROCEDURE — 1160F RVW MEDS BY RX/DR IN RCRD: CPT | Mod: CPTII,,, | Performed by: NURSE PRACTITIONER

## 2022-11-04 PROCEDURE — 3077F SYST BP >= 140 MM HG: CPT | Mod: CPTII,,, | Performed by: NURSE PRACTITIONER

## 2022-11-04 PROCEDURE — 99203 PR OFFICE/OUTPT VISIT, NEW, LEVL III, 30-44 MIN: ICD-10-PCS | Mod: ,,, | Performed by: NURSE PRACTITIONER

## 2022-11-04 PROCEDURE — 87502 POCT INFLUENZA A/B MOLECULAR: ICD-10-PCS | Mod: QW,,, | Performed by: NURSE PRACTITIONER

## 2022-11-04 PROCEDURE — 1159F MED LIST DOCD IN RCRD: CPT | Mod: CPTII,,, | Performed by: NURSE PRACTITIONER

## 2022-11-04 PROCEDURE — 1159F PR MEDICATION LIST DOCUMENTED IN MEDICAL RECORD: ICD-10-PCS | Mod: CPTII,,, | Performed by: NURSE PRACTITIONER

## 2022-11-04 PROCEDURE — 3008F PR BODY MASS INDEX (BMI) DOCUMENTED: ICD-10-PCS | Mod: CPTII,,, | Performed by: NURSE PRACTITIONER

## 2022-11-04 PROCEDURE — 99203 OFFICE O/P NEW LOW 30 MIN: CPT | Mod: ,,, | Performed by: NURSE PRACTITIONER

## 2022-11-04 PROCEDURE — 3080F DIAST BP >= 90 MM HG: CPT | Mod: CPTII,,, | Performed by: NURSE PRACTITIONER

## 2022-11-04 PROCEDURE — 87502 INFLUENZA DNA AMP PROBE: CPT | Mod: QW,,, | Performed by: NURSE PRACTITIONER

## 2022-11-04 PROCEDURE — 3080F PR MOST RECENT DIASTOLIC BLOOD PRESSURE >= 90 MM HG: ICD-10-PCS | Mod: CPTII,,, | Performed by: NURSE PRACTITIONER

## 2022-11-04 RX ORDER — ONDANSETRON 4 MG/1
8 TABLET, ORALLY DISINTEGRATING ORAL 2 TIMES DAILY
Qty: 20 TABLET | Refills: 1 | Status: SHIPPED | OUTPATIENT
Start: 2022-11-04 | End: 2022-11-14

## 2022-11-04 NOTE — PROGRESS NOTES
"Subjective:       Patient ID: Saranya Pacheco is a 26 y.o. female.    Vitals:  height is 5' 8" (1.727 m) and weight is 131.5 kg (290 lb). Her temperature is 98.1 °F (36.7 °C). Her blood pressure is 159/94 (abnormal) and her pulse is 83. Her respiration is 18 and oxygen saturation is 97%.     Chief Complaint: Nausea (Nausea,started Teusday stomach cramps started this AM, would like meds for nausea)    46-year-old female presents with intermittent nausea without vomiting or diarrhea.  Abdominal cramps which are also intermittent for the past several weeks.  States not concerned for pregnancy symptoms worse after eating, states will see PCP and will request a GI consult.    Gastrointestinal:  Positive for nausea.     Objective:      Physical Exam   Constitutional: She is oriented to person, place, and time. She appears well-developed.   HENT:   Head: Normocephalic and atraumatic.   Ears:   Right Ear: External ear normal.   Left Ear: External ear normal.   Nose: Nose normal.   Mouth/Throat: Mucous membranes are normal.   Eyes: Conjunctivae and lids are normal.   Neck: Trachea normal. Neck supple.   Cardiovascular: Normal rate, regular rhythm and normal heart sounds.   Pulmonary/Chest: Effort normal and breath sounds normal. No respiratory distress.   Abdominal: Normal appearance and bowel sounds are normal. She exhibits no distension and no mass. Soft. There is no abdominal tenderness.   Musculoskeletal: Normal range of motion.         General: Normal range of motion.   Neurological: She is alert and oriented to person, place, and time. She has normal strength.   Skin: Skin is warm, dry, intact, not diaphoretic and not pale.   Psychiatric: Her speech is normal and behavior is normal. Judgment and thought content normal.   Nursing note and vitals reviewed.      Assessment:       1. Nausea            Plan:     Take zofran as needed for nausea.  Rest and stay hydrated.  Take tylenol as needed for pain/fever.  Eat a " bland diet for the next few days while your stomach recovers.    Please follow up with your primary care provider within 2-5 days if your signs and symptoms have not resolved or worsen.   Inquire about GI consult with your PCP for worsening or persistent symptoms as planned  If your condition worsens or fails to improve we recommend that you receive another evaluation at the emergency room immediately or contact your primary medical clinic to discuss your concerns.   You must understand that you have received an Urgent Care treatment only and that you may be released before all of your medical problems are known or treated. You, the patient, will arrange for follow up care as instructed.            Nausea  -     POCT Influenza A/B MOLECULAR  -     ondansetron (ZOFRAN-ODT) 4 MG TbDL; Take 2 tablets (8 mg total) by mouth 2 (two) times daily. for 10 days  Dispense: 20 tablet; Refill: 1

## 2022-11-04 NOTE — PATIENT INSTRUCTIONS
Take zofran as needed for nausea.  Rest and stay hydrated.  Take tylenol as needed for pain/fever.  Eat a bland diet for the next few days while your stomach recovers.    Please follow up with your primary care provider within 2-5 days if your signs and symptoms have not resolved or worsen.   Inquire about GI consult with your PCP for worsening or persistent symptoms as planned  If your condition worsens or fails to improve we recommend that you receive another evaluation at the emergency room immediately or contact your primary medical clinic to discuss your concerns.   You must understand that you have received an Urgent Care treatment only and that you may be released before all of your medical problems are known or treated. You, the patient, will arrange for follow up care as instructed.

## 2022-11-06 ENCOUNTER — OFFICE VISIT (OUTPATIENT)
Dept: URGENT CARE | Facility: CLINIC | Age: 27
End: 2022-11-06
Payer: COMMERCIAL

## 2022-11-06 VITALS
SYSTOLIC BLOOD PRESSURE: 164 MMHG | TEMPERATURE: 98 F | HEIGHT: 68 IN | WEIGHT: 290 LBS | RESPIRATION RATE: 20 BRPM | DIASTOLIC BLOOD PRESSURE: 88 MMHG | OXYGEN SATURATION: 95 % | HEART RATE: 92 BPM | BODY MASS INDEX: 43.95 KG/M2

## 2022-11-06 DIAGNOSIS — J10.1 INFLUENZA A: ICD-10-CM

## 2022-11-06 DIAGNOSIS — R50.9 FEVER, UNSPECIFIED FEVER CAUSE: Primary | ICD-10-CM

## 2022-11-06 LAB
CTP QC/QA: YES
CTP QC/QA: YES
POC MOLECULAR INFLUENZA A AGN: POSITIVE
POC MOLECULAR INFLUENZA B AGN: NEGATIVE
SARS-COV-2 RDRP RESP QL NAA+PROBE: NEGATIVE

## 2022-11-06 PROCEDURE — 3077F PR MOST RECENT SYSTOLIC BLOOD PRESSURE >= 140 MM HG: ICD-10-PCS | Mod: CPTII,,, | Performed by: NURSE PRACTITIONER

## 2022-11-06 PROCEDURE — 99213 OFFICE O/P EST LOW 20 MIN: CPT | Mod: ,,, | Performed by: NURSE PRACTITIONER

## 2022-11-06 PROCEDURE — 3008F BODY MASS INDEX DOCD: CPT | Mod: CPTII,,, | Performed by: NURSE PRACTITIONER

## 2022-11-06 PROCEDURE — 87635 SARS-COV-2 COVID-19 AMP PRB: CPT | Mod: QW,,, | Performed by: NURSE PRACTITIONER

## 2022-11-06 PROCEDURE — 3079F DIAST BP 80-89 MM HG: CPT | Mod: CPTII,,, | Performed by: NURSE PRACTITIONER

## 2022-11-06 PROCEDURE — 87502 INFLUENZA DNA AMP PROBE: CPT | Mod: QW,,, | Performed by: NURSE PRACTITIONER

## 2022-11-06 PROCEDURE — 87635: ICD-10-PCS | Mod: QW,,, | Performed by: NURSE PRACTITIONER

## 2022-11-06 PROCEDURE — 3008F PR BODY MASS INDEX (BMI) DOCUMENTED: ICD-10-PCS | Mod: CPTII,,, | Performed by: NURSE PRACTITIONER

## 2022-11-06 PROCEDURE — 1160F PR REVIEW ALL MEDS BY PRESCRIBER/CLIN PHARMACIST DOCUMENTED: ICD-10-PCS | Mod: CPTII,,, | Performed by: NURSE PRACTITIONER

## 2022-11-06 PROCEDURE — 87502 POCT INFLUENZA A/B MOLECULAR: ICD-10-PCS | Mod: QW,,, | Performed by: NURSE PRACTITIONER

## 2022-11-06 PROCEDURE — 3079F PR MOST RECENT DIASTOLIC BLOOD PRESSURE 80-89 MM HG: ICD-10-PCS | Mod: CPTII,,, | Performed by: NURSE PRACTITIONER

## 2022-11-06 PROCEDURE — 1159F MED LIST DOCD IN RCRD: CPT | Mod: CPTII,,, | Performed by: NURSE PRACTITIONER

## 2022-11-06 PROCEDURE — 1160F RVW MEDS BY RX/DR IN RCRD: CPT | Mod: CPTII,,, | Performed by: NURSE PRACTITIONER

## 2022-11-06 PROCEDURE — 3077F SYST BP >= 140 MM HG: CPT | Mod: CPTII,,, | Performed by: NURSE PRACTITIONER

## 2022-11-06 PROCEDURE — 1159F PR MEDICATION LIST DOCUMENTED IN MEDICAL RECORD: ICD-10-PCS | Mod: CPTII,,, | Performed by: NURSE PRACTITIONER

## 2022-11-06 PROCEDURE — 99213 PR OFFICE/OUTPT VISIT, EST, LEVL III, 20-29 MIN: ICD-10-PCS | Mod: ,,, | Performed by: NURSE PRACTITIONER

## 2022-11-06 RX ORDER — OSELTAMIVIR PHOSPHATE 75 MG/1
75 CAPSULE ORAL 2 TIMES DAILY
Qty: 10 CAPSULE | Refills: 0 | Status: SHIPPED | OUTPATIENT
Start: 2022-11-06 | End: 2022-11-11

## 2022-11-06 NOTE — PATIENT INSTRUCTIONS
-Rest and stay hydrated.  -Tylenol every 4 hours OR ibuprofen every 6 hours as needed for pain/fever.  -Flonase OTC or Nasacort OTC for nasal congestion.  -Warm face compresses to help with facial sinus pain/pressure.  -Simple foods like chicken noodle soup.  Mucinex OTC helps with coughing at night  -Zyrtec/Claritin during the day & Benadryl at night may help with allergies.  Take Tamiflu prescription medication as directed  Please follow up with your primary care provider within 2-5 days if your signs and symptoms have not resolved or worsen.     If your condition worsens or fails to improve we recommend that you receive another evaluation at the emergency room immediately or contact your primary medical clinic to discuss your concerns.   You must understand that you have received an Urgent Care treatment only and that you may be released before all of your medical problems are known or treated. You, the patient, will arrange for follow up care as instructed.

## 2022-11-06 NOTE — PROGRESS NOTES
"Subjective:       Patient ID: Saranya Pacheco is a 26 y.o. female.    Vitals:  height is 5' 8" (1.727 m) and weight is 131.5 kg (290 lb). Her oral temperature is 98.2 °F (36.8 °C). Her blood pressure is 164/88 (abnormal) and her pulse is 92. Her respiration is 20 and oxygen saturation is 95%.     Chief Complaint: Fever (Body aches, cough, congestion x 1 day )    26-year-old female presents with cough, fever, body aches, and chills.  Onset yesterday.  No shortness of breath or fever    Constitution: Positive for chills, fatigue and fever.   HENT:  Positive for congestion.    Respiratory:  Positive for cough.      Objective:      Physical Exam   Constitutional: She is oriented to person, place, and time. She appears well-developed. She is cooperative.  Non-toxic appearance. She does not appear ill. No distress.   HENT:   Head: Normocephalic and atraumatic.   Ears:   Right Ear: Hearing, tympanic membrane, external ear and ear canal normal.   Left Ear: Hearing, tympanic membrane, external ear and ear canal normal.   Nose: Nose normal. No mucosal edema, rhinorrhea or nasal deformity. No epistaxis. Right sinus exhibits no maxillary sinus tenderness and no frontal sinus tenderness. Left sinus exhibits no maxillary sinus tenderness and no frontal sinus tenderness.   Mouth/Throat: Uvula is midline, oropharynx is clear and moist and mucous membranes are normal. No trismus in the jaw. Normal dentition. No uvula swelling. No oropharyngeal exudate, posterior oropharyngeal edema or posterior oropharyngeal erythema.   Eyes: Conjunctivae and lids are normal. No scleral icterus.   Neck: Trachea normal and phonation normal. Neck supple. No edema present. No erythema present. No neck rigidity present.   Cardiovascular: Normal rate, regular rhythm, normal heart sounds and normal pulses.   Pulmonary/Chest: Effort normal and breath sounds normal. No respiratory distress. She has no decreased breath sounds. She has no rhonchi. "   Abdominal: Normal appearance.   Musculoskeletal: Normal range of motion.         General: No deformity. Normal range of motion.   Neurological: She is alert and oriented to person, place, and time. She exhibits normal muscle tone. Coordination normal.   Skin: Skin is warm, dry, intact, not diaphoretic and not pale.   Psychiatric: Her speech is normal and behavior is normal. Judgment and thought content normal.   Nursing note and vitals reviewed.      Assessment:       1. Fever, unspecified fever cause    2. Influenza A          Plan:     -Rest and stay hydrated.  -Tylenol every 4 hours OR ibuprofen every 6 hours as needed for pain/fever.  -Flonase OTC or Nasacort OTC for nasal congestion.  -Warm face compresses to help with facial sinus pain/pressure.  -Simple foods like chicken noodle soup.  Mucinex OTC helps with coughing at night  -Zyrtec/Claritin during the day & Benadryl at night may help with allergies.  Take Tamiflu prescription medication as directed  Please follow up with your primary care provider within 2-5 days if your signs and symptoms have not resolved or worsen.     If your condition worsens or fails to improve we recommend that you receive another evaluation at the emergency room immediately or contact your primary medical clinic to discuss your concerns.   You must understand that you have received an Urgent Care treatment only and that you may be released before all of your medical problems are known or treated. You, the patient, will arrange for follow up care as instructed.             Fever, unspecified fever cause  -     POCT Influenza A/B MOLECULAR  -     POCT COVID-19 Rapid Screening    Influenza A  -     oseltamivir (TAMIFLU) 75 MG capsule; Take 1 capsule (75 mg total) by mouth 2 (two) times daily. for 5 days  Dispense: 10 capsule; Refill: 0

## 2022-12-30 ENCOUNTER — OFFICE VISIT (OUTPATIENT)
Dept: URGENT CARE | Facility: CLINIC | Age: 27
End: 2022-12-30
Payer: COMMERCIAL

## 2022-12-30 VITALS
OXYGEN SATURATION: 96 % | BODY MASS INDEX: 43.95 KG/M2 | SYSTOLIC BLOOD PRESSURE: 158 MMHG | WEIGHT: 290 LBS | TEMPERATURE: 98 F | DIASTOLIC BLOOD PRESSURE: 95 MMHG | HEIGHT: 68 IN | RESPIRATION RATE: 20 BRPM | HEART RATE: 92 BPM

## 2022-12-30 DIAGNOSIS — R09.81 SINUS CONGESTION: Primary | ICD-10-CM

## 2022-12-30 DIAGNOSIS — H65.03 NON-RECURRENT ACUTE SEROUS OTITIS MEDIA OF BOTH EARS: ICD-10-CM

## 2022-12-30 LAB
CTP QC/QA: YES
MOLECULAR STREP A: NEGATIVE
POC MOLECULAR INFLUENZA A AGN: NEGATIVE
POC MOLECULAR INFLUENZA B AGN: NEGATIVE
SARS-COV-2 RDRP RESP QL NAA+PROBE: NEGATIVE

## 2022-12-30 PROCEDURE — 1159F PR MEDICATION LIST DOCUMENTED IN MEDICAL RECORD: ICD-10-PCS | Mod: CPTII,,,

## 2022-12-30 PROCEDURE — 1159F MED LIST DOCD IN RCRD: CPT | Mod: CPTII,,,

## 2022-12-30 PROCEDURE — 99213 PR OFFICE/OUTPT VISIT, EST, LEVL III, 20-29 MIN: ICD-10-PCS | Mod: ,,,

## 2022-12-30 PROCEDURE — 1160F PR REVIEW ALL MEDS BY PRESCRIBER/CLIN PHARMACIST DOCUMENTED: ICD-10-PCS | Mod: CPTII,,,

## 2022-12-30 PROCEDURE — 3080F DIAST BP >= 90 MM HG: CPT | Mod: CPTII,,,

## 2022-12-30 PROCEDURE — 3080F PR MOST RECENT DIASTOLIC BLOOD PRESSURE >= 90 MM HG: ICD-10-PCS | Mod: CPTII,,,

## 2022-12-30 PROCEDURE — 87635 SARS-COV-2 COVID-19 AMP PRB: CPT | Mod: QW,,,

## 2022-12-30 PROCEDURE — 3077F SYST BP >= 140 MM HG: CPT | Mod: CPTII,,,

## 2022-12-30 PROCEDURE — 3077F PR MOST RECENT SYSTOLIC BLOOD PRESSURE >= 140 MM HG: ICD-10-PCS | Mod: CPTII,,,

## 2022-12-30 PROCEDURE — 1160F RVW MEDS BY RX/DR IN RCRD: CPT | Mod: CPTII,,,

## 2022-12-30 PROCEDURE — 87502 INFLUENZA DNA AMP PROBE: CPT | Mod: QW,,,

## 2022-12-30 PROCEDURE — 87651 POCT STREP A MOLECULAR: ICD-10-PCS | Mod: QW,,,

## 2022-12-30 PROCEDURE — 87651 STREP A DNA AMP PROBE: CPT | Mod: QW,,,

## 2022-12-30 PROCEDURE — 99213 OFFICE O/P EST LOW 20 MIN: CPT | Mod: ,,,

## 2022-12-30 PROCEDURE — 3008F PR BODY MASS INDEX (BMI) DOCUMENTED: ICD-10-PCS | Mod: CPTII,,,

## 2022-12-30 PROCEDURE — 87502 POCT INFLUENZA A/B MOLECULAR: ICD-10-PCS | Mod: QW,,,

## 2022-12-30 PROCEDURE — 87635: ICD-10-PCS | Mod: QW,,,

## 2022-12-30 PROCEDURE — 3008F BODY MASS INDEX DOCD: CPT | Mod: CPTII,,,

## 2022-12-30 RX ORDER — AMOXICILLIN AND CLAVULANATE POTASSIUM 875; 125 MG/1; MG/1
1 TABLET, FILM COATED ORAL EVERY 12 HOURS
Qty: 10 TABLET | Refills: 0 | Status: SHIPPED | OUTPATIENT
Start: 2022-12-30 | End: 2023-01-04

## 2022-12-30 RX ORDER — PREDNISONE 20 MG/1
20 TABLET ORAL DAILY
Qty: 7 TABLET | Refills: 0 | Status: SHIPPED | OUTPATIENT
Start: 2022-12-30 | End: 2023-01-06

## 2022-12-30 RX ORDER — LEVOCETIRIZINE DIHYDROCHLORIDE 5 MG/1
5 TABLET, FILM COATED ORAL NIGHTLY
Qty: 7 TABLET | Refills: 0 | Status: SHIPPED | OUTPATIENT
Start: 2022-12-30 | End: 2023-07-05

## 2022-12-30 NOTE — LETTER
December 30, 2022      [facility]  [address]  [place] [address]  Phone: [phone]  Fax: [phone]       Patient: Saranya Pacheco   YOB: 1995  Date of Visit: 12/30/2022    To Whom It May Concern:    Rudy Pacheco  was at [facility] on 12/30/2022. She may return to work/school on 12/31/22 with no restrictions. If you have any questions or concerns, or if I can be of further assistance, please do not hesitate to contact me.    Sincerely,    Virginia Flynn LPN

## 2022-12-30 NOTE — PROGRESS NOTES
"Subjective:       Patient ID: Saranya Pacheco is a 27 y.o. female.    Vitals:  height is 5' 8" (1.727 m) and weight is 131.5 kg (290 lb). Her temperature is 98 °F (36.7 °C). Her blood pressure is 158/95 (abnormal) and her pulse is 92. Her respiration is 20 and oxygen saturation is 96%.     Chief Complaint: Sinus Problem (Pt symptoms started Wednesday, sinus congestion, ear pain/fluid in both ears, dizziness, sore throat, and body aches. )    Patient is a 27-year-old female that presents complaining of sinus pressure, stuffy nose, earache, sore throat x 3 days. Patient denies any SOB, CP, rash, n/v/d, or neck stiffness.  Patient states this happens about twice a year when they start burning sugar cane across the street from her house.     Patient was seen here 11/06/2022 and diagnosed with flu at that time.       HENT:  Positive for ear pain, congestion and sore throat.      Objective:      Physical Exam   Constitutional: She is oriented to person, place, and time.  Non-toxic appearance. She does not appear ill.   HENT:   Ears:   Right Ear: External ear and ear canal normal. A middle ear effusion is present.   Left Ear: External ear and ear canal normal. A middle ear effusion is present.   Nose: Rhinorrhea present. Right sinus exhibits maxillary sinus tenderness. Left sinus exhibits maxillary sinus tenderness.   Mouth/Throat: Posterior oropharyngeal erythema present. No tonsillar exudate.   Clear fluid in bilateral ears      Comments: Clear fluid in bilateral ears  Pulmonary/Chest: Effort normal and breath sounds normal.   Abdominal: Normal appearance and bowel sounds are normal. Soft. There is no abdominal tenderness.   Musculoskeletal: Normal range of motion.         General: Normal range of motion.   Neurological: She is alert and oriented to person, place, and time.   Skin: Skin is warm and dry. Capillary refill takes less than 2 seconds.   Psychiatric: Her behavior is normal. Mood normal.       Assessment:    " "   1. Sinus congestion    2. Non-recurrent acute serous otitis media of both ears             Previous History      Review of patient's allergies indicates:  No Known Allergies    Past Medical History:   Diagnosis Date    Asthma     Fatty liver      Current Outpatient Medications   Medication Instructions    amoxicillin-clavulanate 875-125mg (AUGMENTIN) 875-125 mg per tablet 1 tablet, Oral, Every 12 hours    BLISOVI FE 1/20, 28, 1 mg-20 mcg (21)/75 mg (7) per tablet 1 tablet, Oral, Daily    BREO ELLIPTA 200-25 mcg/dose DsDv diskus inhaler 1 puff, Inhalation, Daily    cetirizine (ZYRTEC) 10 mg, Oral, Daily    levocetirizine (XYZAL) 5 mg, Oral, Nightly    omeprazole (PRILOSEC) 40 MG capsule TAKE 1 CAPSULE BY MOUTH DAILY    ondansetron (ZOFRAN-ODT) 8 mg, Oral, 2 times daily    predniSONE (DELTASONE) 20 mg, Oral, Daily     Past Surgical History:   Procedure Laterality Date    ANKLE SURGERY      TONSILLECTOMY      WISDOM TOOTH EXTRACTION       Family History   Problem Relation Age of Onset    Hypotension Mother     Heart attack Mother     Depression Father     Schizophrenia Father     Epilepsy Sister        Social History     Tobacco Use    Smoking status: Never    Smokeless tobacco: Never   Substance Use Topics    Alcohol use: Yes     Alcohol/week: 7.0 standard drinks     Types: 7 Drinks containing 0.5 oz of alcohol per week    Drug use: Never        Physical Exam      Vital Signs Reviewed   BP (!) 158/95   Pulse 92   Temp 98 °F (36.7 °C)   Resp 20   Ht 5' 8" (1.727 m)   Wt 131.5 kg (290 lb)   SpO2 96%   BMI 44.09 kg/m²        Procedures    Procedures     Labs     Results for orders placed or performed in visit on 12/30/22   POCT COVID-19 Rapid Screening   Result Value Ref Range    POC Rapid COVID Negative Negative     Acceptable Yes    POCT Influenza A/B MOLECULAR   Result Value Ref Range    POC Molecular Influenza A Ag Negative Negative, Not Reported    POC Molecular Influenza B Ag Negative " Negative, Not Reported     Acceptable Yes    POCT Strep A, Molecular   Result Value Ref Range    Molecular Strep A, POC Negative Negative     Acceptable Yes       Plan:         Sinus congestion  -     POCT COVID-19 Rapid Screening  -     POCT Influenza A/B MOLECULAR  -     POCT Strep A, Molecular  -     predniSONE (DELTASONE) 20 MG tablet; Take 1 tablet (20 mg total) by mouth once daily. for 7 days  Dispense: 7 tablet; Refill: 0  -     levocetirizine (XYZAL) 5 MG tablet; Take 1 tablet (5 mg total) by mouth every evening. for 7 days  Dispense: 7 tablet; Refill: 0  -     amoxicillin-clavulanate 875-125mg (AUGMENTIN) 875-125 mg per tablet; Take 1 tablet by mouth every 12 (twelve) hours. for 5 days  Dispense: 10 tablet; Refill: 0    Non-recurrent acute serous otitis media of both ears         Flu, COVID, strep negative.    Take OTC cough/cold/congestion medication such as Dayquil/Nyquil.  May also take antihistamine such as Claritin/Zyrtec/Allegra.  Cepacol sore throat lozenges if needed.     Drink plenty of fluids.  Rest.      Prednisone- to help with inflammation- take as prescribed- take with food      Hold antibiotics as this may be viral, If no improvement in 2-3 days, then take antibiotic and take until completely gone.     Serous otitis media: Typically clear fluid behind the ear drum.  This is not an ear infection. Tylenol or ibuprofen for any fever/aches.  Decongestant/Allergy medication to help dry the fluid.  Flonase is a nasal steroid which can help decrease swelling in the sinus/ear tissues and allow fluid to drain easier. Recheck in 4-5 days if not improving.

## 2022-12-30 NOTE — PATIENT INSTRUCTIONS
Flu, COVID, strep negative.    Take OTC cough/cold/congestion medication such as Dayquil/Nyquil.  May also take antihistamine such as Claritin/Zyrtec/Allegra.  Cepacol sore throat lozenges if needed.     Drink plenty of fluids.  Rest.      Prednisone- to help with inflammation- take as prescribed- take with food      Hold antibiotics as this may be viral, If no improvement in 2-3 days, then take antibiotic and take until completely gone.     Serous otitis media: Typically clear fluid behind the ear drum.  This is not an ear infection. Tylenol or ibuprofen for any fever/aches.  Decongestant/Allergy medication to help dry the fluid.  Flonase is a nasal steroid which can help decrease swelling in the sinus/ear tissues and allow fluid to drain easier. Recheck in 4-5 days if not improving.

## 2023-07-05 PROBLEM — Z00.00 WELLNESS EXAMINATION: Status: ACTIVE | Noted: 2023-07-05

## 2023-07-05 PROBLEM — K21.9 GASTROESOPHAGEAL REFLUX DISEASE: Status: ACTIVE | Noted: 2023-07-05

## 2023-07-05 PROBLEM — K76.0 FATTY LIVER: Status: ACTIVE | Noted: 2023-07-05

## 2023-09-04 ENCOUNTER — OFFICE VISIT (OUTPATIENT)
Dept: URGENT CARE | Facility: CLINIC | Age: 28
End: 2023-09-04
Payer: COMMERCIAL

## 2023-09-04 VITALS
HEART RATE: 83 BPM | OXYGEN SATURATION: 97 % | DIASTOLIC BLOOD PRESSURE: 95 MMHG | RESPIRATION RATE: 20 BRPM | HEIGHT: 68 IN | BODY MASS INDEX: 44.41 KG/M2 | WEIGHT: 293 LBS | SYSTOLIC BLOOD PRESSURE: 174 MMHG | TEMPERATURE: 98 F

## 2023-09-04 DIAGNOSIS — J32.9 SINUSITIS, UNSPECIFIED CHRONICITY, UNSPECIFIED LOCATION: Primary | ICD-10-CM

## 2023-09-04 DIAGNOSIS — J02.9 SORE THROAT: ICD-10-CM

## 2023-09-04 PROCEDURE — 99214 OFFICE O/P EST MOD 30 MIN: CPT | Mod: ,,,

## 2023-09-04 PROCEDURE — 87635: ICD-10-PCS | Mod: QW,,,

## 2023-09-04 PROCEDURE — 87502 INFLUENZA DNA AMP PROBE: CPT | Mod: QW,,,

## 2023-09-04 PROCEDURE — 87502 POCT INFLUENZA A/B MOLECULAR: ICD-10-PCS | Mod: QW,,,

## 2023-09-04 PROCEDURE — 87651 POCT STREP A MOLECULAR: ICD-10-PCS | Mod: QW,,,

## 2023-09-04 PROCEDURE — 99214 PR OFFICE/OUTPT VISIT, EST, LEVL IV, 30-39 MIN: ICD-10-PCS | Mod: ,,,

## 2023-09-04 PROCEDURE — 87635 SARS-COV-2 COVID-19 AMP PRB: CPT | Mod: QW,,,

## 2023-09-04 PROCEDURE — 87651 STREP A DNA AMP PROBE: CPT | Mod: QW,,,

## 2023-09-04 RX ORDER — PREDNISONE 20 MG/1
20 TABLET ORAL DAILY
Qty: 5 TABLET | Refills: 0 | Status: SHIPPED | OUTPATIENT
Start: 2023-09-04 | End: 2023-09-09

## 2023-09-04 RX ORDER — AMOXICILLIN AND CLAVULANATE POTASSIUM 875; 125 MG/1; MG/1
1 TABLET, FILM COATED ORAL EVERY 12 HOURS
Qty: 14 TABLET | Refills: 0 | Status: SHIPPED | OUTPATIENT
Start: 2023-09-04 | End: 2023-09-11

## 2023-09-04 NOTE — PROGRESS NOTES
"Subjective:      Patient ID: Saranya Pacheco is a 27 y.o. female.    Vitals:  height is 5' 8" (1.727 m) and weight is 136.1 kg (300 lb). Her oral temperature is 98.2 °F (36.8 °C). Her blood pressure is 174/95 (abnormal) and her pulse is 83. Her respiration is 20 and oxygen saturation is 97%.     Chief Complaint: Sinus Problem (Sinus congestion, sore throat, right ear pain x 5 days.)    Patient is a 27-year-old female that presents complaining of sinus congestion, sinus pressure to her cheeks, sore throat, right ear pain progressively worsening over the past 5 days and not getting better with over-the-counter medications. Patient denies any SOB, CP, rash, n/v/d, or neck stiffness.       Sinus Problem  Associated symptoms include congestion, ear pain, sinus pressure and a sore throat.       HENT:  Positive for ear pain, congestion, postnasal drip, sinus pressure and sore throat.       Objective:     Physical Exam   Constitutional: She is oriented to person, place, and time.  Non-toxic appearance. She does not appear ill.   HENT:   Ears:   Right Ear: External ear and ear canal normal. A middle ear effusion is present.   Left Ear: Tympanic membrane, external ear and ear canal normal.      Comments: Clear fluid in bilateral ears  Nose: Congestion present.   Mouth/Throat: Posterior oropharyngeal erythema present.      Comments: Clear PND noted  Pulmonary/Chest: Effort normal and breath sounds normal.   Abdominal: Normal appearance and bowel sounds are normal. Soft. There is no abdominal tenderness.   Musculoskeletal: Normal range of motion.         General: Normal range of motion.   Neurological: She is alert and oriented to person, place, and time.   Skin: Skin is warm and dry. Capillary refill takes less than 2 seconds.   Psychiatric: Her behavior is normal. Mood normal.       Assessment:     1. Sinusitis, unspecified chronicity, unspecified location    2. Sore throat           Previous History      Review of " "patient's allergies indicates:  No Known Allergies    Past Medical History:   Diagnosis Date    Asthma     Fatty liver     GERD (gastroesophageal reflux disease)      Current Outpatient Medications   Medication Instructions    amoxicillin-clavulanate 875-125mg (AUGMENTIN) 875-125 mg per tablet 1 tablet, Oral, Every 12 hours    BREO ELLIPTA 200-25 mcg/dose DsDv diskus inhaler 1 puff, Inhalation, Daily    cetirizine (ZYRTEC) 10 mg, Oral, Daily    NORETHINDRONE AC-ETH ESTRADIOL ORAL Loestrin 1.5/30 (21) Take No date recorded No form recorded No frequency recorded No route recorded No set duration recorded No set duration amount recorded active No dosage strength recorded No dosage strength units of measure recorded    ondansetron (ZOFRAN-ODT) 8 mg, Oral, Every 12 hours PRN    pantoprazole (PROTONIX) 40 mg, Oral, Daily    predniSONE (DELTASONE) 20 mg, Oral, Daily     Past Surgical History:   Procedure Laterality Date    ANKLE SURGERY      TONSILLECTOMY      WISDOM TOOTH EXTRACTION       Family History   Problem Relation Age of Onset    Hypotension Mother     Heart attack Mother     Depression Father     Schizophrenia Father     Epilepsy Sister        Social History     Tobacco Use    Smoking status: Never    Smokeless tobacco: Never   Substance Use Topics    Alcohol use: Yes     Alcohol/week: 7.0 standard drinks of alcohol     Types: 7 Drinks containing 0.5 oz of alcohol per week     Comment: 3 times a week    Drug use: Never        Physical Exam      Vital Signs Reviewed   BP (!) 174/95 (BP Location: Right forearm)   Pulse 83   Temp 98.2 °F (36.8 °C) (Oral)   Resp 20   Ht 5' 8" (1.727 m)   Wt 136.1 kg (300 lb)   LMP 08/31/2023   SpO2 97%   BMI 45.61 kg/m²        Procedures    Procedures     Labs     Results for orders placed or performed in visit on 09/04/23   POCT COVID-19 Rapid Screening   Result Value Ref Range    POC Rapid COVID Negative Negative     Acceptable Yes    POCT Influenza A/B " MOLECULAR   Result Value Ref Range    POC Molecular Influenza A Ag Negative Negative, Not Reported    POC Molecular Influenza B Ag Negative Negative, Not Reported     Acceptable Yes    POCT Strep A, Molecular   Result Value Ref Range    Molecular Strep A, POC Negative Negative     Acceptable Yes       Plan:       Sinusitis, unspecified chronicity, unspecified location  -     amoxicillin-clavulanate 875-125mg (AUGMENTIN) 875-125 mg per tablet; Take 1 tablet by mouth every 12 (twelve) hours. for 7 days  Dispense: 14 tablet; Refill: 0    Sore throat  -     POCT COVID-19 Rapid Screening  -     POCT Influenza A/B MOLECULAR  -     POCT Strep A, Molecular  -     predniSONE (DELTASONE) 20 MG tablet; Take 1 tablet (20 mg total) by mouth once daily. for 5 days  Dispense: 5 tablet; Refill: 0    Take OTC cough/cold/congestion medication such as Dayquil/Nyquil.  May also take antihistamine such as Claritin/Zyrtec/Allegra.  Cepacol sore throat lozenges if needed.     Prednisone- to help with congestion/inflammation- take as prescribed- take with food      Drink plenty of fluids.  Rest.      Take antibiotic until completely gone. Take with food to avoid upset stomach.

## 2023-09-16 ENCOUNTER — OFFICE VISIT (OUTPATIENT)
Dept: URGENT CARE | Facility: CLINIC | Age: 28
End: 2023-09-16
Payer: COMMERCIAL

## 2023-09-16 VITALS
WEIGHT: 293 LBS | HEART RATE: 86 BPM | SYSTOLIC BLOOD PRESSURE: 137 MMHG | OXYGEN SATURATION: 96 % | HEIGHT: 68 IN | RESPIRATION RATE: 18 BRPM | TEMPERATURE: 98 F | BODY MASS INDEX: 44.41 KG/M2 | DIASTOLIC BLOOD PRESSURE: 84 MMHG

## 2023-09-16 DIAGNOSIS — J30.2 SEASONAL ALLERGIES: ICD-10-CM

## 2023-09-16 DIAGNOSIS — J02.9 SORE THROAT: Primary | ICD-10-CM

## 2023-09-16 DIAGNOSIS — R09.81 NASAL CONGESTION: ICD-10-CM

## 2023-09-16 PROCEDURE — 87651 POCT STREP A MOLECULAR: ICD-10-PCS | Mod: QW,,, | Performed by: PHYSICIAN ASSISTANT

## 2023-09-16 PROCEDURE — 87651 STREP A DNA AMP PROBE: CPT | Mod: QW,,, | Performed by: PHYSICIAN ASSISTANT

## 2023-09-16 PROCEDURE — 87502 POCT INFLUENZA A/B MOLECULAR: ICD-10-PCS | Mod: QW,,, | Performed by: PHYSICIAN ASSISTANT

## 2023-09-16 PROCEDURE — 99214 PR OFFICE/OUTPT VISIT, EST, LEVL IV, 30-39 MIN: ICD-10-PCS | Mod: ,,, | Performed by: PHYSICIAN ASSISTANT

## 2023-09-16 PROCEDURE — 87502 INFLUENZA DNA AMP PROBE: CPT | Mod: QW,,, | Performed by: PHYSICIAN ASSISTANT

## 2023-09-16 PROCEDURE — 87635 SARS-COV-2 COVID-19 AMP PRB: CPT | Mod: QW,,, | Performed by: PHYSICIAN ASSISTANT

## 2023-09-16 PROCEDURE — 87635: ICD-10-PCS | Mod: QW,,, | Performed by: PHYSICIAN ASSISTANT

## 2023-09-16 PROCEDURE — 99214 OFFICE O/P EST MOD 30 MIN: CPT | Mod: ,,, | Performed by: PHYSICIAN ASSISTANT

## 2023-09-16 RX ORDER — PREDNISONE 10 MG/1
10 TABLET ORAL 2 TIMES DAILY
Qty: 10 TABLET | Refills: 0 | Status: SHIPPED | OUTPATIENT
Start: 2023-09-16 | End: 2023-09-21

## 2023-09-16 RX ORDER — PROMETHAZINE HYDROCHLORIDE AND DEXTROMETHORPHAN HYDROBROMIDE 6.25; 15 MG/5ML; MG/5ML
5 SYRUP ORAL EVERY 8 HOURS PRN
Qty: 118 ML | Refills: 0 | Status: SHIPPED | OUTPATIENT
Start: 2023-09-16 | End: 2023-09-21

## 2023-09-16 NOTE — PROGRESS NOTES
"Subjective:      Patient ID: Saranya Pacheco is a 27 y.o. female.    Vitals:  height is 5' 8" (1.727 m) and weight is 136.1 kg (300 lb). Her oral temperature is 98.3 °F (36.8 °C). Her blood pressure is 137/84 and her pulse is 86. Her respiration is 18 and oxygen saturation is 96%.     Chief Complaint: Sore Throat    female reports having 2 weeks of persistent seasonal allergies nasal congestion scratchy sore throat postnasal drip and sneezing with nasal burning flaring up over the last 4 days.  Patient reports mother positive COVID sick contact 2 weeks ago.  Patient states completing prescription prednisone and amoxicillin antibiotic coverage after previous urgent care visit with negative testing.  Patient states chronic seasonal allergies returns to Urgent Care for allergies congestion re-evaluation desire treatment prior to leaving for [facility] next week.        Constitution: Negative for chills, fatigue and fever.   HENT:  Positive for congestion, postnasal drip, sinus pressure and sore throat. Negative for ear pain, sinus pain, trouble swallowing and voice change.    Neck: Negative for neck pain and neck swelling.   Cardiovascular: Negative.    Respiratory:  Positive for cough and asthma. Negative for shortness of breath and stridor.    Gastrointestinal: Negative.    Musculoskeletal:  Negative for muscle ache.   Skin: Negative.  Negative for erythema.   Allergic/Immunologic: Negative.  Positive for seasonal allergies, asthma and sneezing.   Neurological:  Negative for headaches.      Objective:     Physical Exam   Constitutional: She is oriented to person, place, and time. She appears well-developed. She is cooperative.  Non-toxic appearance.      Comments:Awake alert ambulatory nasally congested female speaks in complete sentences   obesity  HENT:   Head: Normocephalic.   Ears:   Right Ear: Hearing, tympanic membrane, external ear and ear canal normal.   Left Ear: Hearing, tympanic membrane, external " ear and ear canal normal.   Nose: Congestion present. No mucosal edema, rhinorrhea or nasal deformity. No epistaxis. Right sinus exhibits no maxillary sinus tenderness and no frontal sinus tenderness. Left sinus exhibits no maxillary sinus tenderness and no frontal sinus tenderness.      Comments: Mild-to-moderate  Mouth/Throat: Uvula is midline, oropharynx is clear and moist and mucous membranes are normal. Mucous membranes are moist. No trismus in the jaw. Normal dentition. No uvula swelling. No oropharyngeal exudate, posterior oropharyngeal edema or posterior oropharyngeal erythema.      Comments: No Edema, no palate petechiae  Eyes: Conjunctivae and lids are normal. No scleral icterus.   Neck: Trachea normal and phonation normal. Neck supple. No edema present. No erythema present. No neck rigidity present.   Cardiovascular: Normal rate, regular rhythm, normal heart sounds and normal pulses.   No murmur heard.Exam reveals no gallop.   Pulmonary/Chest: Effort normal and breath sounds normal. No stridor. No respiratory distress. She has no decreased breath sounds. She has no wheezes. She has no rhonchi. She has no rales.   Musculoskeletal: Normal range of motion.         General: Normal range of motion.      Cervical back: She exhibits no tenderness.   Lymphadenopathy:     She has no cervical adenopathy.   Neurological: no focal deficit. She is alert and oriented to person, place, and time. She displays no weakness. No cranial nerve deficit. She exhibits normal muscle tone.   Skin: Skin is warm, dry, intact, not diaphoretic, not pale and no rash. No erythema   Psychiatric: Her speech is normal.   Nursing note and vitals reviewed.         Previous History      Review of patient's allergies indicates:  No Known Allergies    Past Medical History:   Diagnosis Date    Asthma     Fatty liver     GERD (gastroesophageal reflux disease)      Current Outpatient Medications   Medication Instructions    JENNIFER SOOD 200-25  "mcg/dose DsDv diskus inhaler 1 puff, Inhalation, Daily    cetirizine (ZYRTEC) 10 mg, Oral, Daily    NORETHINDRONE AC-ETH ESTRADIOL ORAL Loestrin 1.5/30 (21) Take No date recorded No form recorded No frequency recorded No route recorded No set duration recorded No set duration amount recorded active No dosage strength recorded No dosage strength units of measure recorded    ondansetron (ZOFRAN-ODT) 8 mg, Oral, Every 12 hours PRN    pantoprazole (PROTONIX) 40 mg, Oral, Daily    predniSONE (DELTASONE) 10 mg, Oral, 2 times daily    promethazine-dextromethorphan (PROMETHAZINE-DM) 6.25-15 mg/5 mL Syrp 5 mLs, Oral, Every 8 hours PRN     Past Surgical History:   Procedure Laterality Date    ANKLE SURGERY      TONSILLECTOMY      WISDOM TOOTH EXTRACTION       Family History   Problem Relation Age of Onset    Hypotension Mother     Heart attack Mother     Depression Father     Schizophrenia Father     Epilepsy Sister        Social History     Tobacco Use    Smoking status: Never    Smokeless tobacco: Never   Substance Use Topics    Alcohol use: Yes     Alcohol/week: 7.0 standard drinks of alcohol     Types: 7 Drinks containing 0.5 oz of alcohol per week     Comment: 3 times a week    Drug use: Never        Physical Exam      Vital Signs Reviewed   /84   Pulse 86   Temp 98.3 °F (36.8 °C) (Oral)   Resp 18   Ht 5' 8" (1.727 m)   Wt 136.1 kg (300 lb)   LMP 08/31/2023   SpO2 96%   BMI 45.61 kg/m²        Procedures    Procedures     Labs     Results for orders placed or performed in visit on 09/16/23   POCT COVID-19 Rapid Screening   Result Value Ref Range    POC Rapid COVID Negative Negative     Acceptable Yes    POCT Strep A, Molecular   Result Value Ref Range    Molecular Strep A, POC Negative Negative     Acceptable Yes    POCT Influenza A/B MOLECULAR   Result Value Ref Range    POC Molecular Influenza A Ag Negative Negative, Not Reported    POC Molecular Influenza B Ag Negative " Negative, Not Reported     Acceptable Yes        Assessment:     1. Sore throat    2. Seasonal allergies    3. Nasal congestion        Plan:   Negative strep, negative COVID, negative influenza testing today    Recommend continue daily allergy medication or consider changing to alternate antihistamine over the next 1-2 weeks if not having sufficient control with previous long-term antihistamine.  May add Benadryl nightly if needed for severe allergies.  Recommend decongestant of choice and continued nasal spray if needed for upper respiratory symptoms.  Recommend prednisone steroid extension.  Phenergan DM sparingly lowest dose if needed for severe cough cold congestion body aches or rest.  Recommend follow-up with primary care physician and Ear Nose and Throat specialist in the next 2 weeks for re-evaluation for continued long-term care planning and management.    Sore throat  -     POCT COVID-19 Rapid Screening  -     POCT Strep A, Molecular  -     POCT Influenza A/B MOLECULAR    Seasonal allergies    Nasal congestion    Other orders  -     predniSONE (DELTASONE) 10 MG tablet; Take 1 tablet (10 mg total) by mouth 2 (two) times daily. for 5 days  Dispense: 10 tablet; Refill: 0  -     promethazine-dextromethorphan (PROMETHAZINE-DM) 6.25-15 mg/5 mL Syrp; Take 5 mLs by mouth every 8 (eight) hours as needed (cough).  Dispense: 118 mL; Refill: 0

## 2023-09-16 NOTE — LETTER
September 16, 2023      [facility] [place] [address]  [place] [address]  Phone: [phone]  Fax: [phone]       Patient: Saranya Pacheco   YOB: 1995  Date of Visit: 09/16/2023    To Whom It May Concern:    Rudy Pacheco  was at [facility] on 09/16/2023. The patient may return to work/school on 09/18/2023 with no restrictions. If you have any questions or concerns, or if I can be of further assistance, please do not hesitate to contact me.    Sincerely,    Alaina Aggarwal LPN

## 2023-09-16 NOTE — PATIENT INSTRUCTIONS
Negative strep, negative COVID, negative influenza testing today    Recommend continue daily allergy medication or consider changing to alternate antihistamine over the next 1-2 weeks if not having sufficient control with previous long-term antihistamine.  May add Benadryl nightly if needed for severe allergies.  Recommend decongestant of choice and continued nasal spray if needed for upper respiratory symptoms.  Recommend prednisone steroid extension.  Phenergan DM sparingly lowest dose if needed for severe cough cold congestion body aches or rest.  Recommend follow-up with primary care physician and Ear Nose and Throat specialist in the next 2 weeks for re-evaluation for continued long-term care planning and management.  
OB/GYN

## 2023-10-09 PROBLEM — Z00.00 WELLNESS EXAMINATION: Status: RESOLVED | Noted: 2023-07-05 | Resolved: 2023-10-09

## 2023-11-22 ENCOUNTER — OFFICE VISIT (OUTPATIENT)
Dept: URGENT CARE | Facility: CLINIC | Age: 28
End: 2023-11-22
Payer: COMMERCIAL

## 2023-11-22 VITALS
TEMPERATURE: 99 F | BODY MASS INDEX: 44.41 KG/M2 | OXYGEN SATURATION: 96 % | DIASTOLIC BLOOD PRESSURE: 99 MMHG | RESPIRATION RATE: 20 BRPM | HEART RATE: 110 BPM | WEIGHT: 293 LBS | HEIGHT: 68 IN | SYSTOLIC BLOOD PRESSURE: 164 MMHG

## 2023-11-22 DIAGNOSIS — R09.81 HEAD CONGESTION: ICD-10-CM

## 2023-11-22 DIAGNOSIS — U07.1 COVID-19: Primary | ICD-10-CM

## 2023-11-22 DIAGNOSIS — J02.9 SORE THROAT: ICD-10-CM

## 2023-11-22 LAB
CTP QC/QA: YES
CTP QC/QA: YES
MOLECULAR STREP A: NEGATIVE
SARS-COV-2 RDRP RESP QL NAA+PROBE: POSITIVE

## 2023-11-22 PROCEDURE — 87635 SARS-COV-2 COVID-19 AMP PRB: CPT | Mod: QW,,,

## 2023-11-22 PROCEDURE — 87651 STREP A DNA AMP PROBE: CPT | Mod: QW,,,

## 2023-11-22 PROCEDURE — 99214 PR OFFICE/OUTPT VISIT, EST, LEVL IV, 30-39 MIN: ICD-10-PCS | Mod: ,,,

## 2023-11-22 PROCEDURE — 87651 POCT STREP A MOLECULAR: ICD-10-PCS | Mod: QW,,,

## 2023-11-22 PROCEDURE — 87635: ICD-10-PCS | Mod: QW,,,

## 2023-11-22 PROCEDURE — 99214 OFFICE O/P EST MOD 30 MIN: CPT | Mod: ,,,

## 2023-11-22 RX ORDER — BENZONATATE 200 MG/1
200 CAPSULE ORAL 3 TIMES DAILY PRN
Qty: 20 CAPSULE | Refills: 0 | Status: SHIPPED | OUTPATIENT
Start: 2023-11-22 | End: 2023-12-02

## 2023-11-22 RX ORDER — METHYLPREDNISOLONE 4 MG/1
TABLET ORAL
Qty: 21 EACH | Refills: 0 | Status: SHIPPED | OUTPATIENT
Start: 2023-11-22 | End: 2023-12-13

## 2023-11-22 NOTE — PROGRESS NOTES
"Subjective:      Patient ID: Saranya Pacheco is a 27 y.o. female.    Vitals:  height is 5' 8" (1.727 m) and weight is 136.1 kg (300 lb). Her oral temperature is 98.5 °F (36.9 °C). Her blood pressure is 164/99 (abnormal) and her pulse is 110. Her respiration is 20 and oxygen saturation is 96%.     Chief Complaint: Sinus Problem (Sinus congestion, coughing, sneezing, stuffiness, body aches, throat hurts, fluid in ears since Monday.)    Patient is a 27-year-old female that presents complaining of a 4 day history of sinus congestion, sore throat, body aches, cough, fluid in bilateral ears. Patient denies any SOB, CP, rash, n/v/d, or neck stiffness.      Patient states her sister had COVID earlier in the week.        HENT:  Positive for ear pain, congestion and sore throat.    Respiratory:  Positive for cough.       Objective:     Physical Exam   Constitutional: She is oriented to person, place, and time.  Non-toxic appearance. She does not appear ill.   HENT:   Ears:   Right Ear: External ear and ear canal normal. A middle ear effusion is present.   Left Ear: External ear and ear canal normal. A middle ear effusion is present.   Nose: Congestion present.   Mouth/Throat: Mucous membranes are moist. Posterior oropharyngeal erythema present. No tonsillar exudate. Oropharynx is clear.   Cardiovascular: Normal rate and normal pulses.   Pulmonary/Chest: Effort normal and breath sounds normal.   Abdominal: Normal appearance and bowel sounds are normal. Soft. There is no abdominal tenderness.   Musculoskeletal: Normal range of motion.         General: Normal range of motion.   Neurological: She is alert and oriented to person, place, and time.   Skin: Skin is warm, dry and no rash. Capillary refill takes less than 2 seconds.   Psychiatric: Her behavior is normal. Mood normal.       Assessment:     1. COVID-19    2. Head congestion    3. Sore throat           Previous History      Review of patient's allergies indicates:  No " "Known Allergies    Past Medical History:   Diagnosis Date    Asthma     Fatty liver     GERD (gastroesophageal reflux disease)      Current Outpatient Medications   Medication Instructions    benzonatate (TESSALON) 200 mg, Oral, 3 times daily PRN    BREO ELLIPTA 200-25 mcg/dose DsDv diskus inhaler 1 puff, Inhalation, Daily    cetirizine (ZYRTEC) 10 mg, Oral, Daily    methylPREDNISolone (MEDROL DOSEPACK) 4 mg tablet use as directed    NORETHINDRONE AC-ETH ESTRADIOL ORAL Loestrin 1.5/30 (21) Take No date recorded No form recorded No frequency recorded No route recorded No set duration recorded No set duration amount recorded active No dosage strength recorded No dosage strength units of measure recorded    ondansetron (ZOFRAN-ODT) 8 mg, Oral, Every 12 hours PRN    pantoprazole (PROTONIX) 40 mg, Oral, Daily     Past Surgical History:   Procedure Laterality Date    ANKLE SURGERY      TONSILLECTOMY      WISDOM TOOTH EXTRACTION       Family History   Problem Relation Age of Onset    Hypotension Mother     Heart attack Mother     Depression Father     Schizophrenia Father     Epilepsy Sister        Social History     Tobacco Use    Smoking status: Never    Smokeless tobacco: Never   Substance Use Topics    Alcohol use: Yes     Alcohol/week: 7.0 standard drinks of alcohol     Types: 7 Drinks containing 0.5 oz of alcohol per week     Comment: 3 times a week    Drug use: Never        Physical Exam      Vital Signs Reviewed   BP (!) 164/99 (BP Location: Left arm)   Pulse 110   Temp 98.5 °F (36.9 °C) (Oral)   Resp 20   Ht 5' 8" (1.727 m)   Wt 136.1 kg (300 lb)   SpO2 96%   BMI 45.61 kg/m²        Procedures    Procedures     Labs     Results for orders placed or performed in visit on 11/22/23   POCT COVID-19 Rapid Screening   Result Value Ref Range    POC Rapid COVID Positive (A) Negative     Acceptable Yes    POCT Strep A, Molecular   Result Value Ref Range    Molecular Strep A, POC Negative Negative    "  Acceptable Yes       Plan:       COVID-19  -     benzonatate (TESSALON) 200 MG capsule; Take 1 capsule (200 mg total) by mouth 3 (three) times daily as needed for Cough.  Dispense: 20 capsule; Refill: 0    Head congestion  -     POCT COVID-19 Rapid Screening  -     POCT Influenza A/B MOLECULAR  -     methylPREDNISolone (MEDROL DOSEPACK) 4 mg tablet; use as directed  Dispense: 21 each; Refill: 0    Sore throat  -     POCT Strep A, Molecular  -     methylPREDNISolone (MEDROL DOSEPACK) 4 mg tablet; use as directed  Dispense: 21 each; Refill: 0      COVID-Take Tylenol (acetaminophen) for fever/aches.  Drink plenty of fluids.     Prednisone- to help with congestion/inflammation- take as prescribed- take with food      Take OTC cough/cold/congestion medication such as Dayquil/Nyquil.    May also take antihistamine such as Claritin/Zyrtec/Allegra.  Cepacol sore throat lozenges if needed.     Drink plenty of fluids.  Rest.     Go directly to ER if you experience any SOB, chest pain, or other worrisome symptoms.      If positive for Covid-19, you should stay in isolation until: 1) At least 5 days have passed since your symptoms first appeared and  2) At least 24 hours have passed since recovery defined as resolution of fever without the use of fever-reducing medications and improvement in symptoms

## 2023-11-30 ENCOUNTER — OFFICE VISIT (OUTPATIENT)
Dept: URGENT CARE | Facility: CLINIC | Age: 28
End: 2023-11-30
Payer: COMMERCIAL

## 2023-11-30 VITALS
TEMPERATURE: 100 F | WEIGHT: 293 LBS | DIASTOLIC BLOOD PRESSURE: 89 MMHG | BODY MASS INDEX: 44.41 KG/M2 | OXYGEN SATURATION: 96 % | HEIGHT: 68 IN | HEART RATE: 120 BPM | RESPIRATION RATE: 20 BRPM | SYSTOLIC BLOOD PRESSURE: 129 MMHG

## 2023-11-30 DIAGNOSIS — R50.9 FEVER, UNSPECIFIED FEVER CAUSE: Primary | ICD-10-CM

## 2023-11-30 LAB
CTP QC/QA: YES
CTP QC/QA: YES
POC MOLECULAR INFLUENZA A AGN: NEGATIVE
POC MOLECULAR INFLUENZA B AGN: NEGATIVE
SARS-COV-2 RDRP RESP QL NAA+PROBE: NEGATIVE

## 2023-11-30 PROCEDURE — 99213 PR OFFICE/OUTPT VISIT, EST, LEVL III, 20-29 MIN: ICD-10-PCS | Mod: ,,, | Performed by: PHYSICIAN ASSISTANT

## 2023-11-30 PROCEDURE — 87502 INFLUENZA DNA AMP PROBE: CPT | Mod: QW,,, | Performed by: PHYSICIAN ASSISTANT

## 2023-11-30 PROCEDURE — 87635 SARS-COV-2 COVID-19 AMP PRB: CPT | Mod: QW,,, | Performed by: PHYSICIAN ASSISTANT

## 2023-11-30 PROCEDURE — 99213 OFFICE O/P EST LOW 20 MIN: CPT | Mod: ,,, | Performed by: PHYSICIAN ASSISTANT

## 2023-11-30 PROCEDURE — 87635: ICD-10-PCS | Mod: QW,,, | Performed by: PHYSICIAN ASSISTANT

## 2023-11-30 PROCEDURE — 87502 POCT INFLUENZA A/B MOLECULAR: ICD-10-PCS | Mod: QW,,, | Performed by: PHYSICIAN ASSISTANT

## 2023-11-30 RX ORDER — PROMETHAZINE HYDROCHLORIDE AND DEXTROMETHORPHAN HYDROBROMIDE 6.25; 15 MG/5ML; MG/5ML
5 SYRUP ORAL EVERY 8 HOURS PRN
Qty: 118 ML | Refills: 0 | Status: SHIPPED | OUTPATIENT
Start: 2023-11-30 | End: 2023-12-05

## 2023-11-30 NOTE — PROGRESS NOTES
"Subjective:      Patient ID: Saranya Pacheco is a 28 y.o. female.    Vitals:  height is 5' 8" (1.727 m) and weight is 136.1 kg (300 lb). Her temperature is 100.1 °F (37.8 °C). Her blood pressure is 129/89 and her pulse is 120 (abnormal). Her respiration is 20 and oxygen saturation is 96%.     Chief Complaint: Sinus Problem (Pt symptoms started last night with body aches, headache, chills, fever (100.3), nausea, and diarrhea. Pt tested positive for Covid on 11/22. Pt's symptoms from Covid improved but now she is having others. )    HPI  female with acute URI symptoms onset 12 days ago seen in urgent care 8 days ago testing positive for COVID discharge home with symptomatic Rx and OTC reports improving now with new body aches headache fever and chills in the last 24 hours presents to urgent Care for re-evaluation.   Sinus Problem     Additional comments: Pt symptoms started last night with body aches,   headache, chills, fever (100.3), nausea, and diarrhea. Pt tested positive   for Covid on 11/22. Pt's symptoms from Covid improved but now she is   having others.     Sinus Problem  Associated symptoms include chills, congestion, coughing, headaches and sinus pressure. Pertinent negatives include no ear pain, neck pain, shortness of breath or sore throat.       Constitution: Positive for chills, fatigue and fever.   HENT:  Positive for congestion and sinus pressure. Negative for ear pain, sinus pain, sore throat, trouble swallowing and voice change.    Neck: Negative for neck pain and neck swelling.   Cardiovascular:  Negative for chest pain.   Respiratory:  Positive for cough. Negative for shortness of breath and stridor.    Gastrointestinal:  Positive for nausea and diarrhea. Negative for vomiting.   Musculoskeletal:  Positive for muscle ache.   Skin: Negative.  Negative for erythema.   Allergic/Immunologic: Negative.    Neurological:  Positive for headaches. Negative for altered mental status. "   Psychiatric/Behavioral:  Negative for altered mental status.       Objective:     Physical Exam   Constitutional: She is oriented to person, place, and time. She appears well-developed. She is cooperative.  Non-toxic appearance. She appears ill.      Comments:Awake alert ambulatory nasally congested female   obesity  HENT:   Head: Normocephalic.   Ears:   Right Ear: Hearing, tympanic membrane, external ear and ear canal normal.   Left Ear: Hearing, tympanic membrane, external ear and ear canal normal.   Nose: Congestion present. No mucosal edema, rhinorrhea or nasal deformity. No epistaxis. Right sinus exhibits no maxillary sinus tenderness and no frontal sinus tenderness. Left sinus exhibits no maxillary sinus tenderness and no frontal sinus tenderness.   Mouth/Throat: Uvula is midline, oropharynx is clear and moist and mucous membranes are normal. Mucous membranes are moist. No trismus in the jaw. Normal dentition. No uvula swelling. No oropharyngeal exudate, posterior oropharyngeal edema or posterior oropharyngeal erythema.   Eyes: Conjunctivae and lids are normal. No scleral icterus.   Neck: Trachea normal and phonation normal. Neck supple. No edema present. No erythema present. No neck rigidity present.   Cardiovascular: Regular rhythm, normal heart sounds and normal pulses. Tachycardia present.   No murmur heard.Exam reveals no gallop.   Pulmonary/Chest: Effort normal and breath sounds normal. No respiratory distress. She has no decreased breath sounds. She has no rhonchi.   Abdominal: She exhibits no distension. Soft. There is no abdominal tenderness. There is no guarding.   Musculoskeletal: Normal range of motion.         General: Normal range of motion.      Cervical back: She exhibits no tenderness.   Lymphadenopathy:     She has no cervical adenopathy.   Neurological: no focal deficit. She is alert and oriented to person, place, and time. She displays no weakness. She exhibits normal muscle tone.  "  Skin: Skin is warm, dry, intact, not diaphoretic, not pale and no rash. No erythema   Psychiatric: Her speech is normal and behavior is normal. Mood, judgment and thought content normal.   Nursing note and vitals reviewed.       Previous History      Review of patient's allergies indicates:  No Known Allergies    Past Medical History:   Diagnosis Date    Asthma     Fatty liver     GERD (gastroesophageal reflux disease)      Current Outpatient Medications   Medication Instructions    benzonatate (TESSALON) 200 mg, Oral, 3 times daily PRN    BREO ELLIPTA 200-25 mcg/dose DsDv diskus inhaler 1 puff, Inhalation, Daily    cetirizine (ZYRTEC) 10 mg, Oral, Daily    methylPREDNISolone (MEDROL DOSEPACK) 4 mg tablet use as directed    NORETHINDRONE AC-ETH ESTRADIOL ORAL Loestrin 1.5/30 (21) Take No date recorded No form recorded No frequency recorded No route recorded No set duration recorded No set duration amount recorded active No dosage strength recorded No dosage strength units of measure recorded    ondansetron (ZOFRAN-ODT) 8 mg, Oral, Every 12 hours PRN    pantoprazole (PROTONIX) 40 mg, Oral, Daily    promethazine-dextromethorphan (PROMETHAZINE-DM) 6.25-15 mg/5 mL Syrp 5 mLs, Oral, Every 8 hours PRN     Past Surgical History:   Procedure Laterality Date    ANKLE SURGERY      TONSILLECTOMY      WISDOM TOOTH EXTRACTION       Family History   Problem Relation Age of Onset    Hypotension Mother     Heart attack Mother     Depression Father     Schizophrenia Father     Epilepsy Sister     Hypotension Sister        Social History     Tobacco Use    Smoking status: Never    Smokeless tobacco: Never   Substance Use Topics    Alcohol use: Yes     Alcohol/week: 7.0 standard drinks of alcohol     Types: 7 Drinks containing 0.5 oz of alcohol per week     Comment: 3 times a week    Drug use: Never        Physical Exam      Vital Signs Reviewed   /89   Pulse (!) 120   Temp 100.1 °F (37.8 °C)   Resp 20   Ht 5' 8" (1.727 m) "   Wt 136.1 kg (300 lb)   LMP 11/15/2023 (Approximate)   SpO2 96%   BMI 45.61 kg/m²        Procedures    Procedures     Labs     Results for orders placed or performed in visit on 11/30/23   POCT Influenza A/B MOLECULAR   Result Value Ref Range    POC Molecular Influenza A Ag Negative Negative, Not Reported    POC Molecular Influenza B Ag Negative Negative, Not Reported     Acceptable Yes    POCT COVID-19 Rapid Screening   Result Value Ref Range    POC Rapid COVID Negative Negative     Acceptable Yes          Assessment:     1. Fever, unspecified fever cause        Plan:   Negative influenza negative COVID testing today though high concern for recent COVID illness and suspected viral illness today.    Recommend alternate Tylenol and ibuprofen every 4-6 hours as needed for aches pains fever or chills.  Encouraged plenty of water noncarbonated fluids hydration and rest over the next 3-5 days.  Recommend repeat home COVID testing in 24 hours.  Recommend Phenergan DM sparingly lowest dose if needed for severe cough cold congestion body aches and rest.  Do not take sedating cough medication drive or operate machinery.  Recommend follow-up with primary care physician in 3-5 days for re-evaluation if not improving.  Recommended emergency department evaluation sooner if symptoms worsen.    Fever, unspecified fever cause  -     POCT Influenza A/B MOLECULAR  -     POCT COVID-19 Rapid Screening    Other orders  -     promethazine-dextromethorphan (PROMETHAZINE-DM) 6.25-15 mg/5 mL Syrp; Take 5 mLs by mouth every 8 (eight) hours as needed (cough).  Dispense: 118 mL; Refill: 0

## 2023-11-30 NOTE — LETTER
November 30, 2023      [facility]  [address]    [place] [address]  Phone: [phone]  Fax: [phone]       Patient: Saranya Pacheco   YOB: 1995  Date of Visit: 11/30/2023    To Whom It May Concern:    Rudy Pacheco  was at [facility] on 11/30/2023. The patient may return to work/school on 12/04/2023 with no restrictions. If you have any questions or concerns, or if I can be of further assistance, please do not hesitate to contact me.    Sincerely,    Neva Self RT

## 2023-11-30 NOTE — PATIENT INSTRUCTIONS
Negative influenza negative COVID testing today though high concern for recent COVID illness and suspected viral illness today.    Recommend alternate Tylenol and ibuprofen every 4-6 hours as needed for aches pains fever or chills.  Encouraged plenty of water noncarbonated fluids hydration and rest over the next 3-5 days.  Recommend repeat home COVID testing in 24 hours.  Recommend Phenergan DM sparingly lowest dose if needed for severe cough cold congestion body aches and rest.  Do not take sedating cough medication drive or operate machinery.  Recommend follow-up with primary care physician in 3-5 days for re-evaluation if not improving.  Recommended emergency department evaluation sooner if symptoms worsen.

## 2024-06-04 NOTE — PROGRESS NOTES
"GI Problem (More symptoms x 2 weeks)       HPI:    Patient reports having alternating diarrhea and constipation for the past few weeks.  She has episodes of this periodically they last for 2-3 months and then resolved.  Her last episode was the end of last year.  She denies any change in her diet.  Her reflux has been well controlled.  She reports occasional nausea; no vomiting.  She denies any melena or hematochezia.  She denies any fever chills.  She denies unintentional weight loss.  Patient reports drinking sufficient water.  She also tries to eat fiber regularly.  She denies any abnormal stressors.      Current Outpatient Medications   Medication Instructions    BREO ELLIPTA 200-25 mcg/dose DsDv diskus inhaler 1 puff, Inhalation, Daily    cetirizine (ZYRTEC) 10 mg, Oral, Daily    NORETHINDRONE AC-ETH ESTRADIOL ORAL Loestrin 1.5/30 (21) Take No date recorded No form recorded No frequency recorded No route recorded No set duration recorded No set duration amount recorded active No dosage strength recorded No dosage strength units of measure recorded    omeprazole (PRILOSEC) 40 mg, Oral, Daily    ondansetron (ZOFRAN-ODT) 8 mg, Oral, Every 12 hours PRN    pantoprazole (PROTONIX) 40 mg, Oral, Daily         ROS:    She would like to see a dermatologist.  She has a lesion at the lateral corner of her right eye.  She also has multiple skin tags she would like removed.      PE:    ..Visit Vitals  /81   Pulse (!) 59   Temp 98 °F (36.7 °C)   Ht 5' 8" (1.727 m)   Wt (!) 139.7 kg (307 lb 14.4 oz)   LMP 05/31/2024 (Approximate)   SpO2 97%   BMI 46.82 kg/m²        General:  She is a well-developed well-nourished obese white female in no apparent distress.  She is alert and oriented.    Chest:  Clear to auscultation bilaterally.    CV: Regular rate rhythm without murmurs rubs or gallops.    Abdomen:  Soft, nontender, obese, normoactive bowel sounds  Skin:  She has a pedunculated white nodule at the lateral corner of her " right eye.  She has multiple skin tags around her neck.        1. Irritable bowel syndrome with both constipation and diarrhea  Overview:  Patient's symptoms are consistent with irritable bowel syndrome; no alarming symptoms noted.   Patient advised to drink plenty of water.    Patient advised to take Metamucil daily.  Take probiotics daily.    Take Imodium a day as needed for diarrheal episodes.    Refer to GI per patient request.  Call if symptoms persist, worsen or new symptoms develop.    Orders:  -     Ambulatory referral/consult to Gastroenterology; Future; Expected date: 06/13/2024    2. Skin lesion  Overview:  Patient presents with pedunculated lesion to lateral corner of right eye.    She also has multiple skin tags.  She requests referral to Dermatology; referral made.    Orders:  -     Ambulatory referral/consult to Dermatology; Future; Expected date: 06/13/2024    3. Morbid (severe) obesity due to excess calories  Overview:  06/06/2024: Patient encouraged to make healthy food choices and limit portions.    Patient encouraged to limit intake of fried foods, processed foods and sugary foods.    Patient encouraged to lose weight.                 ..No follow-ups on file.       No future appointments.

## 2024-06-06 ENCOUNTER — TELEPHONE (OUTPATIENT)
Dept: PRIMARY CARE CLINIC | Facility: CLINIC | Age: 29
End: 2024-06-06

## 2024-06-06 ENCOUNTER — OFFICE VISIT (OUTPATIENT)
Dept: PRIMARY CARE CLINIC | Facility: CLINIC | Age: 29
End: 2024-06-06
Payer: COMMERCIAL

## 2024-06-06 VITALS
TEMPERATURE: 98 F | OXYGEN SATURATION: 97 % | DIASTOLIC BLOOD PRESSURE: 81 MMHG | WEIGHT: 293 LBS | HEART RATE: 59 BPM | SYSTOLIC BLOOD PRESSURE: 137 MMHG | HEIGHT: 68 IN | BODY MASS INDEX: 44.41 KG/M2

## 2024-06-06 DIAGNOSIS — L98.9 SKIN LESION: ICD-10-CM

## 2024-06-06 DIAGNOSIS — K58.2 IRRITABLE BOWEL SYNDROME WITH BOTH CONSTIPATION AND DIARRHEA: Primary | ICD-10-CM

## 2024-06-06 DIAGNOSIS — E66.01 MORBID (SEVERE) OBESITY DUE TO EXCESS CALORIES: ICD-10-CM

## 2024-06-06 PROCEDURE — 3008F BODY MASS INDEX DOCD: CPT | Mod: CPTII,,, | Performed by: FAMILY MEDICINE

## 2024-06-06 PROCEDURE — 1160F RVW MEDS BY RX/DR IN RCRD: CPT | Mod: CPTII,,, | Performed by: FAMILY MEDICINE

## 2024-06-06 PROCEDURE — 3079F DIAST BP 80-89 MM HG: CPT | Mod: CPTII,,, | Performed by: FAMILY MEDICINE

## 2024-06-06 PROCEDURE — 3075F SYST BP GE 130 - 139MM HG: CPT | Mod: CPTII,,, | Performed by: FAMILY MEDICINE

## 2024-06-06 PROCEDURE — 1159F MED LIST DOCD IN RCRD: CPT | Mod: CPTII,,, | Performed by: FAMILY MEDICINE

## 2024-06-06 PROCEDURE — 99214 OFFICE O/P EST MOD 30 MIN: CPT | Mod: ,,, | Performed by: FAMILY MEDICINE

## 2024-06-06 RX ORDER — OMEPRAZOLE 40 MG/1
40 CAPSULE, DELAYED RELEASE ORAL DAILY
COMMUNITY

## 2024-06-06 NOTE — TELEPHONE ENCOUNTER
Can you put in a referral for dr bety machado for patient please          ----- Message from Chase Minor sent at 6/6/2024  2:28 PM CDT -----  .Who Called: Saranya Jessie Pacheco    Does the patient already have the specialty appointment scheduled?:no  If yes, what is the date of that appointment?:  Referral to What Specialty:Gastroenterology   Reason for Referral:  Does the patient want the referral with a specific physician?:yes  If yes, which provider?: Dr. Bety Machado P: [phone] F: [phone]   [facility] the [facility] an [facility]?:N/A    Preferred Method of Contact: Phone Call  Patient's Preferred Phone Number on File: [phone]   Best Call Back Number, if different:  Additional Information:

## 2024-06-06 NOTE — TELEPHONE ENCOUNTER
----- Message from Rosalva Calhoun sent at 6/6/2024  1:40 PM CDT -----  Who Called: Saranya Pacheco    Does the patient already have the specialty appointment scheduled?:no  If yes, what is the date of that appointment?: none  Referral to What Specialty: dermatology  Reason for Referral: skin issues  Does the patient want the referral with a specific physician?:no  If yes, which provider?:   [facility]    Preferred Method of Contact: Phone Call  Patient's Preferred Phone Number on File: [phone]   Best Call Back Number, if different:  Additional Information:  pt called to request a re-send of referral and stated she would prefer referral sent [place] dermatology only,due to not wanting to go outside of [facility],  please advise, thanks

## 2024-07-01 ENCOUNTER — TELEPHONE (OUTPATIENT)
Dept: PRIMARY CARE CLINIC | Facility: CLINIC | Age: 29
End: 2024-07-01
Payer: COMMERCIAL

## 2024-07-01 DIAGNOSIS — K21.9 GASTROESOPHAGEAL REFLUX DISEASE, UNSPECIFIED WHETHER ESOPHAGITIS PRESENT: Primary | ICD-10-CM

## 2024-07-01 RX ORDER — OMEPRAZOLE 40 MG/1
40 CAPSULE, DELAYED RELEASE ORAL DAILY
Qty: 30 CAPSULE | Refills: 3 | Status: SHIPPED | OUTPATIENT
Start: 2024-07-01 | End: 2024-07-01 | Stop reason: ALTCHOICE

## 2024-07-01 NOTE — TELEPHONE ENCOUNTER
----- Message from Мария William sent at 7/1/2024  8:23 AM CDT -----  .Type:  RX Refill Request    Who Called: pt  Refill or New Rx:refill  RX Name and Strength:omeprazole (PRILOSEC) 40 MG capsule  How is the patient currently taking it? (ex. 1XDay):1/day  Is this a 30 day or 90 day RX:90  Preferred Pharmacy with phone number:[facility] on [facility]  Local or Mail Order:Local  Ordering Provider:Kuldip  Would the patient rather a call back or a response via [facility]?   Best Call Back Number:[phone]  Additional Information: omeprazole (PRILOSEC) 40 MG capsule

## 2024-07-03 ENCOUNTER — LAB VISIT (OUTPATIENT)
Dept: LAB | Facility: HOSPITAL | Age: 29
End: 2024-07-03
Attending: STUDENT IN AN ORGANIZED HEALTH CARE EDUCATION/TRAINING PROGRAM
Payer: COMMERCIAL

## 2024-07-03 DIAGNOSIS — R19.4 FREQUENT BOWEL MOVEMENTS: ICD-10-CM

## 2024-07-03 DIAGNOSIS — R14.0 GASTRIC TYMPANY: Primary | ICD-10-CM

## 2024-07-03 DIAGNOSIS — R10.9 STOMACH ACHE: ICD-10-CM

## 2024-07-03 LAB
ALBUMIN SERPL-MCNC: 3.6 G/DL (ref 3.5–5)
ALBUMIN/GLOB SERPL: 1.2 RATIO (ref 1.1–2)
ALP SERPL-CCNC: 75 UNIT/L (ref 40–150)
ALT SERPL-CCNC: 42 UNIT/L (ref 0–55)
ANION GAP SERPL CALC-SCNC: 6 MEQ/L
AST SERPL-CCNC: 27 UNIT/L (ref 5–34)
BASOPHILS # BLD AUTO: 0.09 X10(3)/MCL
BASOPHILS NFR BLD AUTO: 0.9 %
BILIRUB SERPL-MCNC: 0.6 MG/DL
BUN SERPL-MCNC: 7.6 MG/DL (ref 7–18.7)
CALCIUM SERPL-MCNC: 9.6 MG/DL (ref 8.4–10.2)
CHLORIDE SERPL-SCNC: 110 MMOL/L (ref 98–107)
CO2 SERPL-SCNC: 25 MMOL/L (ref 22–29)
CREAT SERPL-MCNC: 0.79 MG/DL (ref 0.55–1.02)
CREAT/UREA NIT SERPL: 10
CRP SERPL HS-MCNC: 12.23 MG/L
EOSINOPHIL # BLD AUTO: 0.21 X10(3)/MCL (ref 0–0.9)
EOSINOPHIL NFR BLD AUTO: 2 %
ERYTHROCYTE [DISTWIDTH] IN BLOOD BY AUTOMATED COUNT: 13.1 % (ref 11.5–17)
GFR SERPLBLD CREATININE-BSD FMLA CKD-EPI: >60 ML/MIN/1.73/M2
GLOBULIN SER-MCNC: 2.9 GM/DL (ref 2.4–3.5)
GLUCOSE SERPL-MCNC: 101 MG/DL (ref 74–100)
HCT VFR BLD AUTO: 40.5 % (ref 37–47)
HGB BLD-MCNC: 13.3 G/DL (ref 12–16)
IMM GRANULOCYTES # BLD AUTO: 0.05 X10(3)/MCL (ref 0–0.04)
IMM GRANULOCYTES NFR BLD AUTO: 0.5 %
LYMPHOCYTES # BLD AUTO: 4.66 X10(3)/MCL (ref 0.6–4.6)
LYMPHOCYTES NFR BLD AUTO: 44.8 %
MCH RBC QN AUTO: 31.1 PG (ref 27–31)
MCHC RBC AUTO-ENTMCNC: 32.8 G/DL (ref 33–36)
MCV RBC AUTO: 94.6 FL (ref 80–94)
MONOCYTES # BLD AUTO: 0.7 X10(3)/MCL (ref 0.1–1.3)
MONOCYTES NFR BLD AUTO: 6.7 %
NEUTROPHILS # BLD AUTO: 4.7 X10(3)/MCL (ref 2.1–9.2)
NEUTROPHILS NFR BLD AUTO: 45.1 %
NRBC BLD AUTO-RTO: 0 %
PLATELET # BLD AUTO: 426 X10(3)/MCL (ref 130–400)
PMV BLD AUTO: 9.3 FL (ref 7.4–10.4)
POTASSIUM SERPL-SCNC: 4.8 MMOL/L (ref 3.5–5.1)
PROT SERPL-MCNC: 6.5 GM/DL (ref 6.4–8.3)
RBC # BLD AUTO: 4.28 X10(6)/MCL (ref 4.2–5.4)
SODIUM SERPL-SCNC: 141 MMOL/L (ref 136–145)
WBC # BLD AUTO: 10.41 X10(3)/MCL (ref 4.5–11.5)

## 2024-07-03 PROCEDURE — 86003 ALLG SPEC IGE CRUDE XTRC EA: CPT

## 2024-07-03 PROCEDURE — 86364 TISS TRNSGLTMNASE EA IG CLAS: CPT

## 2024-07-03 PROCEDURE — 36415 COLL VENOUS BLD VENIPUNCTURE: CPT

## 2024-07-03 PROCEDURE — 86141 C-REACTIVE PROTEIN HS: CPT

## 2024-07-03 PROCEDURE — 85025 COMPLETE CBC W/AUTO DIFF WBC: CPT

## 2024-07-03 PROCEDURE — 82784 ASSAY IGA/IGD/IGG/IGM EACH: CPT

## 2024-07-03 PROCEDURE — 80053 COMPREHEN METABOLIC PANEL: CPT

## 2024-07-08 LAB
FOOD ALLERG MIX5 IGE QN: <0.1 KU/L
IGA SERPL-MCNC: 141 MG/DL (ref 61–356)
IMMUNOLOGIST REVIEW: NORMAL
TTG IGA SER IA-ACNC: <1.2 U/ML

## 2024-10-09 ENCOUNTER — TELEPHONE (OUTPATIENT)
Dept: PRIMARY CARE CLINIC | Facility: CLINIC | Age: 29
End: 2024-10-09
Payer: COMMERCIAL

## 2024-10-09 DIAGNOSIS — K21.9 GASTROESOPHAGEAL REFLUX DISEASE, UNSPECIFIED WHETHER ESOPHAGITIS PRESENT: Primary | ICD-10-CM

## 2024-10-09 RX ORDER — OMEPRAZOLE 40 MG/1
40 CAPSULE, DELAYED RELEASE ORAL
COMMUNITY
Start: 2024-07-01 | End: 2024-10-09 | Stop reason: SDUPTHER

## 2024-10-09 RX ORDER — OMEPRAZOLE 40 MG/1
40 CAPSULE, DELAYED RELEASE ORAL EVERY MORNING
Qty: 90 CAPSULE | Refills: 3 | Status: SHIPPED | OUTPATIENT
Start: 2024-10-09

## 2024-10-09 NOTE — TELEPHONE ENCOUNTER
----- Message from Мария sent at 10/9/2024 12:25 PM CDT -----  .Type:  RX Refill Request    Who Called: PT  Refill or New Rx:REFILL  RX Name and Strength:omeprazole (PRILOSEC) 40 MG capsule  How is the patient currently taking it? (ex. 1XDay):1/day  Is this a 30 day or 90 day RX:90  Preferred Pharmacy with phone number:[facility] on [address]  Local or Mail Order:Local  Ordering Provider:Kuldip  Would the patient rather a call back or a response via [facility]?   Best Call Back Number:[phone]   Additional Information: omeprazole (PRILOSEC) 40 MG capsule

## 2024-12-28 ENCOUNTER — OFFICE VISIT (OUTPATIENT)
Dept: URGENT CARE | Facility: CLINIC | Age: 29
End: 2024-12-28
Payer: COMMERCIAL

## 2024-12-28 VITALS
BODY MASS INDEX: 44.41 KG/M2 | DIASTOLIC BLOOD PRESSURE: 88 MMHG | TEMPERATURE: 98 F | HEART RATE: 70 BPM | SYSTOLIC BLOOD PRESSURE: 145 MMHG | RESPIRATION RATE: 19 BRPM | WEIGHT: 293 LBS | OXYGEN SATURATION: 97 % | HEIGHT: 68 IN

## 2024-12-28 DIAGNOSIS — R68.89 FLU-LIKE SYMPTOMS: ICD-10-CM

## 2024-12-28 DIAGNOSIS — J06.9 ACUTE URI: Primary | ICD-10-CM

## 2024-12-28 LAB
CTP QC/QA: YES
CTP QC/QA: YES
MOLECULAR STREP A: NEGATIVE
POC MOLECULAR INFLUENZA A AGN: NEGATIVE
POC MOLECULAR INFLUENZA B AGN: NEGATIVE

## 2024-12-28 RX ORDER — DEXAMETHASONE SODIUM PHOSPHATE 10 MG/ML
10 INJECTION INTRAMUSCULAR; INTRAVENOUS
Status: COMPLETED | OUTPATIENT
Start: 2024-12-28 | End: 2024-12-28

## 2024-12-28 RX ORDER — CHLOPHEDIANOL HCL AND PYRILAMINE MALEATE 12.5; 12.5 MG/5ML; MG/5ML
10 SOLUTION ORAL
Qty: 200 ML | Refills: 0 | Status: SHIPPED | OUTPATIENT
Start: 2024-12-28

## 2024-12-28 RX ORDER — DICYCLOMINE HYDROCHLORIDE 20 MG/1
TABLET ORAL
COMMUNITY
Start: 2024-07-03

## 2024-12-28 RX ADMIN — DEXAMETHASONE SODIUM PHOSPHATE 10 MG: 10 INJECTION INTRAMUSCULAR; INTRAVENOUS at 09:12

## 2024-12-28 NOTE — PROGRESS NOTES
"Subjective:      Patient ID: Saranya Pacheco is a 29 y.o. female.    Vitals:  height is 5' 7.87" (1.724 m) and weight is 137.6 kg (303 lb 6.4 oz) (abnormal). Her oral temperature is 98.3 °F (36.8 °C). Her blood pressure is 145/88 (abnormal) and her pulse is 70. Her respiration is 19 and oxygen saturation is 97%.     Chief Complaint: Sore Throat (3 days, sore throat, sinus congestion, productive cough, gen body aches)    28 y/o female c/o Sore Throat x 3 days. He also c/o sinus congestion, productive cough, gen body aches.  She denies fever, shortness of breaths, wheeze, hemoptysis, malaise.       Sore Throat   Associated symptoms include congestion and coughing. Pertinent negatives include no ear discharge, ear pain, shortness of breath, stridor or trouble swallowing.       Constitution: Positive for chills. Negative for sweating, fatigue and fever.   HENT:  Positive for congestion and sore throat. Negative for ear pain, ear discharge, postnasal drip, sinus pain, sinus pressure, trouble swallowing and voice change.    Neck: neck negative.   Cardiovascular: Negative.    Eyes: Negative.    Respiratory:  Positive for cough and sputum production. Negative for chest tightness, bloody sputum, shortness of breath, stridor and wheezing.    Gastrointestinal: Negative.    Endocrine: negative.   Genitourinary: Negative.    Musculoskeletal: Negative.    Skin: Negative.    Allergic/Immunologic: Negative.    Neurological: Negative.  Negative for disorientation and altered mental status.   Hematologic/Lymphatic: Negative.    Psychiatric/Behavioral:  Negative for altered mental status, disorientation and confusion.       Objective:     Physical Exam   Constitutional: She is oriented to person, place, and time. She appears well-developed. She is cooperative.  Non-toxic appearance. She does not appear ill. No distress.   HENT:   Head: Normocephalic and atraumatic.   Ears:   Right Ear: Hearing, tympanic membrane, external ear and " ear canal normal.   Left Ear: Hearing, tympanic membrane, external ear and ear canal normal.   Nose: Nose normal. No mucosal edema, rhinorrhea, nasal deformity or congestion. No epistaxis. Right sinus exhibits no maxillary sinus tenderness and no frontal sinus tenderness. Left sinus exhibits no maxillary sinus tenderness and no frontal sinus tenderness.   Mouth/Throat: Uvula is midline, oropharynx is clear and moist and mucous membranes are normal. No trismus in the jaw. Normal dentition. No uvula swelling. No oropharyngeal exudate, posterior oropharyngeal edema or posterior oropharyngeal erythema.   Eyes: Conjunctivae and lids are normal. No scleral icterus.   Neck: Trachea normal and phonation normal. Neck supple. No edema present. No erythema present. No neck rigidity present.   Cardiovascular: Normal rate, regular rhythm, normal heart sounds and normal pulses.   Pulmonary/Chest: Effort normal and breath sounds normal. No respiratory distress. She has no decreased breath sounds. She has no wheezes. She has no rhonchi. She has no rales.   Abdominal: Normal appearance.   Musculoskeletal: Normal range of motion.         General: No deformity. Normal range of motion.   Neurological: She is alert and oriented to person, place, and time. She exhibits normal muscle tone. Coordination normal.   Skin: Skin is warm, dry, intact, not diaphoretic and not pale.   Psychiatric: Her speech is normal and behavior is normal. Judgment and thought content normal.   Nursing note and vitals reviewed.         Previous History      Review of patient's allergies indicates:  No Known Allergies    Past Medical History:   Diagnosis Date    Asthma     Fatty liver      Current Outpatient Medications   Medication Instructions    BREO ELLIPTA 200-25 mcg/dose DsDv diskus inhaler 1 puff, Inhalation, Daily    cetirizine (ZYRTEC) 10 mg, Daily    dicyclomine (BENTYL) 20 mg tablet     NORETHINDRONE AC-ETH ESTRADIOL ORAL     omeprazole (PRILOSEC) 40  "mg, Oral, Every morning    ondansetron (ZOFRAN-ODT) 8 mg, Oral, Every 12 hours PRN    pantoprazole (PROTONIX) 40 mg, Oral    pyrilamine-chlophedianoL (NINJACOF) 12.5-12.5 mg/5 mL Liqd 10 mLs, Oral, Every 6-8 hours PRN     Past Surgical History:   Procedure Laterality Date    ANKLE SURGERY      TONSILLECTOMY      WISDOM TOOTH EXTRACTION       Family History   Problem Relation Name Age of Onset    Heart disease Mother      Hypertension Mother      Hyperlipidemia Mother      Hypotension Mother      Heart attack Mother      Kidney disease Father      Hyperlipidemia Father      Hypertension Father      Depression Father      Schizophrenia Father      Liver disease Father      Epilepsy Sister      Hypotension Sister         Social History     Tobacco Use    Smoking status: Never    Smokeless tobacco: Never   Substance Use Topics    Alcohol use: Yes     Alcohol/week: 7.0 standard drinks of alcohol     Types: 7 Drinks containing 0.5 oz of alcohol per week     Comment: 3 times a week    Drug use: Never        Physical Exam      Vital Signs Reviewed   BP (!) 145/88   Pulse 70   Temp 98.3 °F (36.8 °C) (Oral)   Resp 19   Ht 5' 7.87" (1.724 m)   Wt (!) 137.6 kg (303 lb 6.4 oz)   SpO2 97%   BMI 46.30 kg/m²        Procedures    Procedures     Labs     Results for orders placed or performed in visit on 12/28/24   POCT Strep A, Molecular    Collection Time: 12/28/24  8:53 AM   Result Value Ref Range    Molecular Strep A, POC Negative Negative     Acceptable Yes    POCT Influenza A/B MOLECULAR    Collection Time: 12/28/24  8:58 AM   Result Value Ref Range    POC Molecular Influenza A Ag Negative Negative    POC Molecular Influenza B Ag Negative Negative     Acceptable Yes       Assessment:     1. Acute URI    2. Flu-like symptoms        Plan:   Strep and flu are negative  Medications sent to pharmacy.   Increase fluids intake to prevent dehydration. You may take Tylenol and ibuprofen as directed " if needed. Get plenty of rest. May use saline nose spray and humidifer at bedtime. Warm saltwater gargles for sore throat. Warm water with honey to help coat the throat. Throat lozenges. Chloraseptic spray for worsening sore throat. Do not smoke or allow others to smoke around you. Practice good hand hygiene to include frequent hand washing to lessen the likelihood of transmission. Return or seek immediate medical attention for any new or worsening symptoms such as trouble breathing, continued high fever, neck stiffness, rash, or if you do not get better as expected.      Acute URI    Flu-like symptoms  -     POCT Strep A, Molecular  -     POCT Influenza A/B MOLECULAR    Other orders  -     dexAMETHasone injection 10 mg  -     pyrilamine-chlophedianoL (NINJACOF) 12.5-12.5 mg/5 mL Liqd; Take 10 mLs by mouth every 6 to 8 hours as needed (cough).  Dispense: 200 mL; Refill: 0

## 2024-12-28 NOTE — PATIENT INSTRUCTIONS
Strep and flu are negative  Medications sent to pharmacy.   Increase fluids intake to prevent dehydration. You may take Tylenol and ibuprofen as directed if needed. Get plenty of rest. May use saline nose spray and humidifer at bedtime. Warm saltwater gargles for sore throat. Warm water with honey to help coat the throat. Throat lozenges. Chloraseptic spray for worsening sore throat. Do not smoke or allow others to smoke around you. Practice good hand hygiene to include frequent hand washing to lessen the likelihood of transmission. Return or seek immediate medical attention for any new or worsening symptoms such as trouble breathing, continued high fever, neck stiffness, rash, or if you do not get better as expected.

## 2025-02-15 ENCOUNTER — OFFICE VISIT (OUTPATIENT)
Dept: URGENT CARE | Facility: CLINIC | Age: 30
End: 2025-02-15
Payer: COMMERCIAL

## 2025-02-15 VITALS
HEART RATE: 95 BPM | DIASTOLIC BLOOD PRESSURE: 89 MMHG | SYSTOLIC BLOOD PRESSURE: 129 MMHG | RESPIRATION RATE: 18 BRPM | TEMPERATURE: 99 F | OXYGEN SATURATION: 97 % | WEIGHT: 293 LBS | BODY MASS INDEX: 45.99 KG/M2 | HEIGHT: 67 IN

## 2025-02-15 DIAGNOSIS — J06.9 URI WITH COUGH AND CONGESTION: Primary | ICD-10-CM

## 2025-02-15 LAB
CTP QC/QA: YES
MOLECULAR STREP A: NEGATIVE
POC MOLECULAR INFLUENZA A AGN: NEGATIVE
POC MOLECULAR INFLUENZA B AGN: NEGATIVE
POC RSV RAPID ANT MOLECULAR: POSITIVE
SARS CORONAVIRUS 2 ANTIGEN: NEGATIVE

## 2025-02-15 RX ORDER — PREDNISONE 20 MG/1
20 TABLET ORAL 2 TIMES DAILY
Qty: 10 TABLET | Refills: 0 | Status: SHIPPED | OUTPATIENT
Start: 2025-02-15 | End: 2025-02-20

## 2025-02-15 RX ORDER — BENZONATATE 200 MG/1
200 CAPSULE ORAL 3 TIMES DAILY PRN
Qty: 21 CAPSULE | Refills: 0 | Status: SHIPPED | OUTPATIENT
Start: 2025-02-15 | End: 2025-02-25

## 2025-02-15 NOTE — PATIENT INSTRUCTIONS
RSV positive  Negative flu, strep, COVID  Drink plenty of fluids. Get plenty of rest.   Claritin, Zyrtec or other over-the-counter antihistamine for runny nose, postnasal drip, nasal congestion.  Nasal spray such as Nasacort or Flonase for congestion.  Prescription cough syrup has antihistamine present, do not combine with other antihistamines or cough medications.  Over-the-counter decongestants such as Sudafed, phenylephrine or pseudoephedrine.  Avoid these if you have a history of high blood pressure.  Warm saltwater gargles for sore throat.  Warm water with honey to help coat the throat.  Throat lozenges.  Chloraseptic spray for worsening sore throat.  Tylenol or ibuprofen as needed for sore throat and fever.  May alternate every 3 hours.    Call or return to clinic as needed   Go to the ER with any significant change or worsening of symptoms.   Follow up with your primary care doctor.

## 2025-02-15 NOTE — PROGRESS NOTES
"Subjective:      Patient ID: Saranya Pacheco is a 29 y.o. female.    Vitals:  height is 5' 7" (1.702 m) and weight is 138.3 kg (305 lb) (abnormal). Her tympanic temperature is 99 °F (37.2 °C). Her blood pressure is 129/89 and her pulse is 95. Her respiration is 18 and oxygen saturation is 97%.     Chief Complaint: Sore Throat     Patient is a 29 y.o. female who presents to urgent care with complaints of sore throat, nasal congestion, rhinorrhea, sinus pressure x3 days.  Developed cough.  Alleviating factors include OTC medication with mild amount of relief. Patient denies fever, body aches, chills, neck stiffness, rash, GI symptom.      ROS   Objective:     Physical Exam   Constitutional: She is oriented to person, place, and time. She appears well-developed. She is cooperative.  Non-toxic appearance. She does not appear ill. No distress.   HENT:   Head: Normocephalic and atraumatic.   Ears:   Right Ear: Hearing, external ear and ear canal normal.   Left Ear: Hearing, external ear and ear canal normal.      Comments: Bilateral TM: Small amount of clear effusion  Nose: Congestion present. No mucosal edema, rhinorrhea or nasal deformity. No epistaxis. Right sinus exhibits no maxillary sinus tenderness and no frontal sinus tenderness. Left sinus exhibits no maxillary sinus tenderness and no frontal sinus tenderness.   Mouth/Throat: Uvula is midline, oropharynx is clear and moist and mucous membranes are normal. Mucous membranes are moist. No trismus in the jaw. Normal dentition. No uvula swelling. No oropharyngeal exudate, posterior oropharyngeal edema or posterior oropharyngeal erythema.   Eyes: Conjunctivae and lids are normal. No scleral icterus.   Neck: Trachea normal and phonation normal. Neck supple. No edema present. No erythema present. No neck rigidity present.   Cardiovascular: Normal rate, regular rhythm, normal heart sounds and normal pulses.   Pulmonary/Chest: Effort normal and breath sounds normal. No " respiratory distress. She has no decreased breath sounds. She has no rhonchi.   Abdominal: Normal appearance.   Musculoskeletal: Normal range of motion.         General: No deformity. Normal range of motion.   Neurological: She is alert and oriented to person, place, and time. She exhibits normal muscle tone. Coordination normal.   Skin: Skin is warm, dry, intact, not diaphoretic and not pale.   Psychiatric: Her speech is normal and behavior is normal. Judgment and thought content normal.   Nursing note and vitals reviewed.      Assessment:     1. URI with cough and congestion        Plan:       URI with cough and congestion  -     POCT Influenza A/B Molecular  -     POCT Strep A, Molecular  -     SARS Coronavirus 2 Antigen, POCT Manual Read  -     POCT RSV by Molecular  -     benzonatate (TESSALON) 200 MG capsule; Take 1 capsule (200 mg total) by mouth 3 (three) times daily as needed for Cough.  Dispense: 21 capsule; Refill: 0  -     pyrilamine-chlophedianoL 12.5-12.5 mg/5 mL Liqd; Take 10 mLs by mouth every 8 (eight) hours as needed.  Dispense: 180 mL; Refill: 0  -     predniSONE (DELTASONE) 20 MG tablet; Take 1 tablet (20 mg total) by mouth 2 (two) times daily. for 5 days  Dispense: 10 tablet; Refill: 0           Last had steroid injection in clinic 2 months ago, did not have oral steroids for steroids prior to that for quite awhile.  Due to this will send in short-term course of oral prednisone.    RSV positive  Negative flu, strep, COVID  Drink plenty of fluids. Get plenty of rest.   Claritin, Zyrtec or other over-the-counter antihistamine for runny nose, postnasal drip, nasal congestion.  Nasal spray such as Nasacort or Flonase for congestion.  Prescription cough syrup has antihistamine present, do not combine with other antihistamines or cough medications.  Over-the-counter decongestants such as Sudafed, phenylephrine or pseudoephedrine.  Avoid these if you have a history of high blood pressure.  Warm  saltwater gargles for sore throat.  Warm water with honey to help coat the throat.  Throat lozenges.  Chloraseptic spray for worsening sore throat.  Tylenol or ibuprofen as needed for sore throat and fever.  May alternate every 3 hours.    Call or return to clinic as needed   Go to the ER with any significant change or worsening of symptoms.   Follow up with your primary care doctor.

## 2025-05-05 ENCOUNTER — OFFICE VISIT (OUTPATIENT)
Dept: URGENT CARE | Facility: CLINIC | Age: 30
End: 2025-05-05
Payer: COMMERCIAL

## 2025-05-05 VITALS
OXYGEN SATURATION: 99 % | HEART RATE: 103 BPM | HEIGHT: 67 IN | TEMPERATURE: 100 F | RESPIRATION RATE: 18 BRPM | SYSTOLIC BLOOD PRESSURE: 140 MMHG | BODY MASS INDEX: 45.99 KG/M2 | WEIGHT: 293 LBS | DIASTOLIC BLOOD PRESSURE: 88 MMHG

## 2025-05-05 DIAGNOSIS — J02.9 PHARYNGITIS, UNSPECIFIED ETIOLOGY: Primary | ICD-10-CM

## 2025-05-05 DIAGNOSIS — R50.9 FEVER, UNSPECIFIED FEVER CAUSE: ICD-10-CM

## 2025-05-05 DIAGNOSIS — J06.9 URI WITH COUGH AND CONGESTION: ICD-10-CM

## 2025-05-05 LAB
CTP QC/QA: YES
MOLECULAR STREP A: NEGATIVE
POC MOLECULAR INFLUENZA A AGN: NEGATIVE
POC MOLECULAR INFLUENZA B AGN: NEGATIVE
SARS CORONAVIRUS 2 ANTIGEN: NEGATIVE

## 2025-05-05 PROCEDURE — 87811 SARS-COV-2 COVID19 W/OPTIC: CPT | Mod: QW,,,

## 2025-05-05 PROCEDURE — 87651 STREP A DNA AMP PROBE: CPT | Mod: QW,,,

## 2025-05-05 PROCEDURE — 99214 OFFICE O/P EST MOD 30 MIN: CPT | Mod: ,,,

## 2025-05-05 PROCEDURE — 87502 INFLUENZA DNA AMP PROBE: CPT | Mod: QW,,,

## 2025-05-05 RX ORDER — BENZONATATE 200 MG/1
200 CAPSULE ORAL 3 TIMES DAILY PRN
Qty: 30 CAPSULE | Refills: 0 | Status: SHIPPED | OUTPATIENT
Start: 2025-05-05 | End: 2025-05-15

## 2025-05-05 NOTE — PROGRESS NOTES
"Subjective:      Patient ID: Saranya Pacheco is a 29 y.o. female.    Vitals:  height is 5' 7" (1.702 m) and weight is 138.3 kg (305 lb) (abnormal). Her tympanic temperature is 100.2 °F (37.9 °C). Her blood pressure is 140/88 (abnormal) and her pulse is 103. Her respiration is 18 and oxygen saturation is 99%.     Chief Complaint: Cough     Patient is a 29 y.o. female who presents to urgent care with complaints of cough, sore throat, subjective fever symptoms with body aches, chills, bilateral otalgia, right worse than left which began this morning.  Alleviating factors include Advil with mild amount of relief. Patient denies neck stiffness, rash, GI symptoms, SOB.     Cough      Respiratory:  Positive for cough.       Objective:     Physical Exam   Constitutional: She is oriented to person, place, and time. She appears well-developed. She is cooperative.  Non-toxic appearance. She does not appear ill. No distress.   HENT:   Head: Normocephalic and atraumatic.   Ears:   Right Ear: Hearing, tympanic membrane, external ear and ear canal normal.   Left Ear: Hearing, tympanic membrane, external ear and ear canal normal.   Nose: Rhinorrhea and congestion present. No mucosal edema or nasal deformity. No epistaxis. Right sinus exhibits maxillary sinus tenderness. Right sinus exhibits no frontal sinus tenderness. Left sinus exhibits maxillary sinus tenderness. Left sinus exhibits no frontal sinus tenderness.   Mouth/Throat: Uvula is midline, oropharynx is clear and moist and mucous membranes are normal. Mucous membranes are moist. No trismus in the jaw. Normal dentition. No uvula swelling. No oropharyngeal exudate, posterior oropharyngeal edema or posterior oropharyngeal erythema.   Eyes: Conjunctivae and lids are normal. No scleral icterus.   Neck: Trachea normal and phonation normal. Neck supple. No edema present. No erythema present. No neck rigidity present.   Cardiovascular: Normal rate, regular rhythm, normal heart " sounds and normal pulses.   Pulmonary/Chest: Effort normal and breath sounds normal. No respiratory distress. She has no decreased breath sounds. She has no wheezes. She has no rhonchi. She has no rales.   Abdominal: Normal appearance.   Musculoskeletal: Normal range of motion.         General: No deformity. Normal range of motion.   Lymphadenopathy:     She has no cervical adenopathy.   Neurological: She is alert and oriented to person, place, and time. She exhibits normal muscle tone. Coordination normal.   Skin: Skin is warm, dry, intact, not diaphoretic and not pale.   Psychiatric: Her speech is normal and behavior is normal. Judgment and thought content normal.   Nursing note and vitals reviewed.      Assessment:     1. Pharyngitis, unspecified etiology    2. URI with cough and congestion    3. Fever, unspecified fever cause        Plan:       Pharyngitis, unspecified etiology  -     POCT Influenza A/B Molecular  -     SARS Coronavirus 2 Antigen, POCT Manual Read  -     POCT Strep A, Molecular    URI with cough and congestion  -     benzonatate (TESSALON) 200 MG capsule; Take 1 capsule (200 mg total) by mouth 3 (three) times daily as needed for Cough.  Dispense: 30 capsule; Refill: 0    Fever, unspecified fever cause        Negative flu, strep, COVID, discussed likely other viral etiologies.  Drink plenty of fluids. Get plenty of rest.   Claritin, Zyrtec, or other over-the-counter antihistamine for runny nose, postnasal drip, nasal congestion.  Nasal spray such as Nasacort or Flonase for congestion.  Over-the-counter cough medication as needed and as directed.  Over-the-counter decongestants such as Sudafed, phenylephrine or pseudoephedrine.  Avoid these if you have a history of high blood pressure.  Warm saltwater gargles for sore throat.  Warm water with honey to help coat the throat.  Throat lozenges.  Chloraseptic spray for worsening sore throat.  Tylenol or ibuprofen as needed for sore throat and fever.   May alternate every 3 hours    Call or return to clinic as needed   Go to the ER with any significant change or worsening of symptoms.   Follow up with your primary care doctor.

## 2025-05-06 NOTE — PATIENT INSTRUCTIONS
Excuse for tomorrow, may call back for extended excuse if needed, remain home until your fever free for 24 hours without the use of medication and symptoms are improving.    Negative flu, strep, COVID, discussed likely other viral etiologies.  Drink plenty of fluids. Get plenty of rest.   Claritin, Zyrtec, or other over-the-counter antihistamine for runny nose, postnasal drip, nasal congestion.  Nasal spray such as Nasacort or Flonase for congestion.  Over-the-counter cough medication as needed and as directed.  Over-the-counter decongestants such as Sudafed, phenylephrine or pseudoephedrine.  Avoid these if you have a history of high blood pressure.  Warm saltwater gargles for sore throat.  Warm water with honey to help coat the throat.  Throat lozenges.  Chloraseptic spray for worsening sore throat.  Tylenol or ibuprofen as needed for sore throat and fever.  May alternate every 3 hours    Call or return to clinic as needed   Go to the ER with any significant change or worsening of symptoms.   Follow up with your primary care doctor.

## 2025-05-07 ENCOUNTER — TELEPHONE (OUTPATIENT)
Dept: URGENT CARE | Facility: CLINIC | Age: 30
End: 2025-05-07
Payer: COMMERCIAL

## 2025-05-07 RX ORDER — PREDNISONE 20 MG/1
20 TABLET ORAL DAILY
Qty: 5 TABLET | Refills: 0 | Status: SHIPPED | OUTPATIENT
Start: 2025-05-07 | End: 2025-05-12

## 2025-05-07 NOTE — TELEPHONE ENCOUNTER
----- Message from LILIANA Santiago sent at 5/7/2025  9:33 AM CDT -----  Regarding: FW: Medication request  Patient's chart reviewed, I will send a 5 day prednisone course to her pharmacy.  We may extend her excuse for tomorrow.  If she continues to have symptoms then I recommend she return for re-evaluation.  ----- Message -----  From: Virginia Flynn LPN  Sent: 5/7/2025   8:13 AM CDT  To: [facility]  Subject: Medication request                               Pt was seen on 5/5/25 and diagnosed with pharyngitis and an acute uri. Pt is having a persistent couth and sinus congestion but denies any sob or current fever. Pt states her fever broke yesterday 5/6/25. Pt is requesting that prednisone be sent to the [facility] [address] and [facility] by rhoda. Pt is also requesting an excuse to return to work next Monday 5/12/25. After discussing with Annabella Queen NP, pt informed that we will send prednisone to the pharmacy and extend her excuse to return Friday 5/9/25 and if symptoms persist to call the clinic for reevaluation. Pt voiced thanks and understanding with no further questions.

## 2025-05-07 NOTE — TELEPHONE ENCOUNTER
Patient says that she discussed with Nikole that if symptoms did not improve she would send steroids to her pharmacy.  I will go ahead and send prednisone.  She also asked for an extending of excuse.  We will extend the excuse to cover for tomorrow and she may return on Friday.  Follow up as needed.

## 2025-05-07 NOTE — TELEPHONE ENCOUNTER
Pt called office to ask about excuse being extended to Monday. Allison made her aware that the provider stated that we could not extend the excuse until Monday. But we did call in a 5 day prescription of Prednisone to the pharmacy.

## 2025-05-08 ENCOUNTER — OFFICE VISIT (OUTPATIENT)
Dept: URGENT CARE | Facility: CLINIC | Age: 30
End: 2025-05-08
Payer: COMMERCIAL

## 2025-05-08 VITALS
OXYGEN SATURATION: 97 % | HEIGHT: 67 IN | WEIGHT: 293 LBS | DIASTOLIC BLOOD PRESSURE: 89 MMHG | TEMPERATURE: 99 F | HEART RATE: 91 BPM | SYSTOLIC BLOOD PRESSURE: 138 MMHG | BODY MASS INDEX: 45.99 KG/M2 | RESPIRATION RATE: 20 BRPM

## 2025-05-08 DIAGNOSIS — R05.9 COUGH, UNSPECIFIED TYPE: Primary | ICD-10-CM

## 2025-05-08 LAB
CTP QC/QA: YES
CTP QC/QA: YES
POC MOLECULAR INFLUENZA A AGN: NEGATIVE
POC MOLECULAR INFLUENZA B AGN: NEGATIVE
SARS CORONAVIRUS 2 ANTIGEN: NEGATIVE

## 2025-05-08 RX ORDER — ALBUTEROL SULFATE 90 UG/1
2 INHALANT RESPIRATORY (INHALATION) EVERY 6 HOURS PRN
Qty: 18 G | Refills: 0 | Status: SHIPPED | OUTPATIENT
Start: 2025-05-08 | End: 2026-05-08

## 2025-05-08 RX ORDER — AZITHROMYCIN 250 MG/1
TABLET, FILM COATED ORAL
Qty: 6 TABLET | Refills: 0 | Status: SHIPPED | OUTPATIENT
Start: 2025-05-08

## 2025-05-08 NOTE — PROGRESS NOTES
"Subjective:      Patient ID: Saranya Pacheco is a 29 y.o. female.    Vitals:  height is 5' 7" (1.702 m) and weight is 138.3 kg (305 lb) (abnormal). Her oral temperature is 98.8 °F (37.1 °C). Her blood pressure is 138/89 and her pulse is 91. Her respiration is 20 and oxygen saturation is 97%.     Chief Complaint: Cough     Patient is a 29 y.o. female who presents to urgent care with complaints of was here on Monday with fever (resolved), cough, and body aches.  Cough has gotten progressively worse and now has shortness of breath about 2 days ago  Alleviating factors include nyquil,tessalon pearls, and mucinex with mild amount of relief.  She has started a 5 day course of prednisone yesterday.     Cough  Associated symptoms include shortness of breath. Pertinent negatives include no chills, ear pain, fever, hemoptysis, postnasal drip, sore throat or wheezing.     Constitution: Negative for chills, sweating, fatigue and fever.   HENT:  Negative for ear pain, ear discharge, congestion, postnasal drip, sinus pain, sinus pressure and sore throat.    Neck: neck negative.   Cardiovascular: Negative.    Eyes: Negative.    Respiratory:  Positive for cough and shortness of breath. Negative for chest tightness, sputum production, bloody sputum, stridor and wheezing.    Gastrointestinal: Negative.    Endocrine: negative.   Genitourinary: Negative.    Musculoskeletal: Negative.    Skin: Negative.    Allergic/Immunologic: Negative.    Neurological: Negative.  Negative for disorientation and altered mental status.   Hematologic/Lymphatic: Negative.    Psychiatric/Behavioral:  Negative for altered mental status, disorientation and confusion.       Objective:     Physical Exam   Constitutional: She is oriented to person, place, and time. She appears well-developed. She is cooperative.  Non-toxic appearance. She does not appear ill. No distress.   HENT:   Head: Normocephalic and atraumatic.   Ears:   Right Ear: Hearing, tympanic " membrane, external ear and ear canal normal.   Left Ear: Hearing, tympanic membrane, external ear and ear canal normal.   Nose: Nose normal. No mucosal edema, rhinorrhea or nasal deformity. No epistaxis. Right sinus exhibits no maxillary sinus tenderness and no frontal sinus tenderness. Left sinus exhibits no maxillary sinus tenderness and no frontal sinus tenderness.   Mouth/Throat: Uvula is midline, oropharynx is clear and moist and mucous membranes are normal. No trismus in the jaw. Normal dentition. No uvula swelling. No oropharyngeal exudate, posterior oropharyngeal edema or posterior oropharyngeal erythema.   Eyes: Conjunctivae and lids are normal. No scleral icterus.   Neck: Trachea normal and phonation normal. Neck supple. No edema present. No erythema present. No neck rigidity present.   Cardiovascular: Normal rate, regular rhythm, normal heart sounds and normal pulses.   Pulmonary/Chest: Effort normal and breath sounds normal. No respiratory distress. She has no decreased breath sounds. She has no wheezes. She has no rhonchi. She has no rales.   Abdominal: Normal appearance.   Musculoskeletal: Normal range of motion.         General: No deformity. Normal range of motion.   Neurological: She is alert and oriented to person, place, and time. She exhibits normal muscle tone. Coordination normal.   Skin: Skin is warm, dry, intact, not diaphoretic and not pale.   Psychiatric: Her speech is normal and behavior is normal. Judgment and thought content normal.   Nursing note and vitals reviewed.         Previous History      Review of patient's allergies indicates:  No Known Allergies    Past Medical History:   Diagnosis Date    Asthma     Fatty liver      Current Outpatient Medications   Medication Instructions    albuterol (VENTOLIN HFA) 90 mcg/actuation inhaler 2 puffs, Inhalation, Every 6 hours PRN, Rescue    azithromycin (Z-AUREA) 250 MG tablet Take 2 tablets by mouth on day 1; Take 1 tablet by mouth on days 2-5  "   benzonatate (TESSALON) 200 mg, Oral, 3 times daily PRN    omeprazole (PRILOSEC) 40 mg, Oral, Every morning    predniSONE (DELTASONE) 20 mg, Oral, Daily     Past Surgical History:   Procedure Laterality Date    ANKLE SURGERY      TONSILLECTOMY      WISDOM TOOTH EXTRACTION       Family History   Problem Relation Name Age of Onset    Heart disease Mother      Hypertension Mother      Hyperlipidemia Mother      Hypotension Mother      Heart attack Mother      Kidney disease Father      Hyperlipidemia Father      Hypertension Father      Depression Father      Schizophrenia Father      Liver disease Father      Epilepsy Sister      Hypotension Sister         Social History[1]     Physical Exam      Vital Signs Reviewed   /89 (BP Location: Left arm, Patient Position: Sitting)   Pulse 91   Temp 98.8 °F (37.1 °C) (Oral)   Resp 20   Ht 5' 7" (1.702 m)   Wt (!) 138.3 kg (305 lb)   LMP 05/01/2025 (Approximate)   SpO2 97%   BMI 47.77 kg/m²        Procedures    Procedures     Labs     Results for orders placed or performed in visit on 05/08/25   SARS Coronavirus 2 Antigen, POCT Manual Read    Collection Time: 05/08/25 12:53 PM   Result Value Ref Range    SARS Coronavirus 2 Antigen Negative Negative, Presumptive Negative     Acceptable Yes    POCT Influenza A/B MOLECULAR    Collection Time: 05/08/25 12:59 PM   Result Value Ref Range    POC Molecular Influenza A Ag Negative Negative    POC Molecular Influenza B Ag Negative Negative     Acceptable Yes       Assessment:     1. Cough, unspecified type        Plan:   COVID and flu Test are negative  Medications sent to pharmacy.   Continue prednisone until finished.   Increase fluids intake to prevent dehydration. You may take Tylenol and ibuprofen as directed if needed. Get plenty of rest. May use saline nose spray and humidifer at bedtime. Warm saltwater gargles for sore throat. Warm water with honey to help coat the throat. Throat " lozenges. Chloraseptic spray for worsening sore throat. Do not smoke or allow others to smoke around you. Practice good hand hygiene to include frequent hand washing to lessen the likelihood of transmission. Return or seek immediate medical attention for any new or worsening symptoms such as trouble breathing, continued high fever, neck stiffness, rash, or if you do not get better as expected.      Cough, unspecified type  -     POCT Influenza A/B MOLECULAR  -     SARS Coronavirus 2 Antigen, POCT Manual Read    Other orders  -     azithromycin (Z-AUREA) 250 MG tablet; Take 2 tablets by mouth on day 1; Take 1 tablet by mouth on days 2-5  Dispense: 6 tablet; Refill: 0  -     albuterol (VENTOLIN HFA) 90 mcg/actuation inhaler; Inhale 2 puffs into the lungs every 6 (six) hours as needed for Wheezing. Rescue  Dispense: 18 g; Refill: 0                         [1]   Social History  Tobacco Use    Smoking status: Never    Smokeless tobacco: Never   Substance Use Topics    Alcohol use: Yes     Alcohol/week: 7.0 standard drinks of alcohol     Types: 7 Drinks containing 0.5 oz of alcohol per week     Comment: 3 times a week    Drug use: Never

## 2025-05-08 NOTE — PATIENT INSTRUCTIONS
COVID and flu Test are negative  Medications sent to pharmacy. Increase fluids intake to prevent dehydration. You may take Tylenol and ibuprofen as directed if needed. Get plenty of rest. May use saline nose spray and humidifer at bedtime. Warm saltwater gargles for sore throat. Warm water with honey to help coat the throat. Throat lozenges. Chloraseptic spray for worsening sore throat. Do not smoke or allow others to smoke around you. Practice good hand hygiene to include frequent hand washing to lessen the likelihood of transmission. Return or seek immediate medical attention for any new or worsening symptoms such as trouble breathing, continued high fever, neck stiffness, rash, or if you do not get better as expected.

## 2025-05-11 ENCOUNTER — TELEPHONE (OUTPATIENT)
Dept: URGENT CARE | Facility: CLINIC | Age: 30
End: 2025-05-11
Payer: COMMERCIAL

## 2025-05-11 NOTE — TELEPHONE ENCOUNTER
Patient was seen on 05/05/2025 and diagnosed with cough and sore throat. Patient also was seen on 05/08/2025 and diagnosed with cough. Patient called the clinic today stating that she still has a cough and her left ear is hurting and she can not hear out of it. Patient also states she finish antibiotics, and is asking if there is something else we can prescribe to help or should she come back in to get reevaluated? Please advise. Patient call back number is [phone] . Her Pharmacy is [facility] on [address] at [address]

## 2025-05-28 ENCOUNTER — TELEPHONE (OUTPATIENT)
Dept: PRIMARY CARE CLINIC | Facility: CLINIC | Age: 30
End: 2025-05-28
Payer: COMMERCIAL

## 2025-05-28 NOTE — TELEPHONE ENCOUNTER
Copied from CRM #[id number]. Topic: General Inquiry - Patient Advice  >> May 28, 2025  7:03 AM Rosalva wrote:  Who Called: Saranya Pacheco    Caller is requesting assistance/information from provider's office.    Symptoms (please be specific): coughing, congestion,sinus drip   How long has patient had these symptoms:  1 month  List of preferred pharmacies on file (remove unneeded): [unfilled]  If different, enter pharmacy into here including location and phone number: [id number] [place] [place] [facility] [address] AT [address] & [address]   Phone: [phone]  Fax: [phone]            Preferred Method of Contact: Phone Call  Patient's Preferred Phone Number on File: [phone]   Best Call Back Number, if different:  Additional Information: medical advice, please advise, thanks